# Patient Record
Sex: MALE | Race: WHITE | Employment: FULL TIME | URBAN - METROPOLITAN AREA
[De-identification: names, ages, dates, MRNs, and addresses within clinical notes are randomized per-mention and may not be internally consistent; named-entity substitution may affect disease eponyms.]

---

## 2020-12-21 ENCOUNTER — OFFICE VISIT (OUTPATIENT)
Dept: INTERNAL MEDICINE | Age: 65
End: 2020-12-21
Payer: COMMERCIAL

## 2020-12-21 VITALS
RESPIRATION RATE: 16 BRPM | DIASTOLIC BLOOD PRESSURE: 88 MMHG | HEIGHT: 70 IN | OXYGEN SATURATION: 99 % | TEMPERATURE: 98 F | SYSTOLIC BLOOD PRESSURE: 150 MMHG | WEIGHT: 199.2 LBS | BODY MASS INDEX: 28.52 KG/M2 | HEART RATE: 80 BPM

## 2020-12-21 DIAGNOSIS — Z00.00 ANNUAL PHYSICAL EXAM: ICD-10-CM

## 2020-12-21 LAB
ALBUMIN SERPL-MCNC: 4.3 G/DL (ref 3.5–4.6)
ALP BLD-CCNC: 72 U/L (ref 35–104)
ALT SERPL-CCNC: 16 U/L (ref 0–41)
ANION GAP SERPL CALCULATED.3IONS-SCNC: 11 MEQ/L (ref 9–15)
AST SERPL-CCNC: 19 U/L (ref 0–40)
BASOPHILS ABSOLUTE: 0 K/UL (ref 0–0.2)
BASOPHILS RELATIVE PERCENT: 0.6 %
BILIRUB SERPL-MCNC: 0.4 MG/DL (ref 0.2–0.7)
BUN BLDV-MCNC: 13 MG/DL (ref 8–23)
CALCIUM SERPL-MCNC: 9.4 MG/DL (ref 8.5–9.9)
CHLORIDE BLD-SCNC: 103 MEQ/L (ref 95–107)
CHOLESTEROL, TOTAL: 176 MG/DL (ref 0–199)
CO2: 26 MEQ/L (ref 20–31)
CREAT SERPL-MCNC: 0.8 MG/DL (ref 0.7–1.2)
EOSINOPHILS ABSOLUTE: 0.3 K/UL (ref 0–0.7)
EOSINOPHILS RELATIVE PERCENT: 4 %
GFR AFRICAN AMERICAN: >60
GFR NON-AFRICAN AMERICAN: >60
GLOBULIN: 3.5 G/DL (ref 2.3–3.5)
GLUCOSE BLD-MCNC: 91 MG/DL (ref 70–99)
HCT VFR BLD CALC: 42.5 % (ref 42–52)
HDLC SERPL-MCNC: 45 MG/DL (ref 40–59)
HEMOGLOBIN: 14.1 G/DL (ref 14–18)
LDL CHOLESTEROL CALCULATED: 112 MG/DL (ref 0–129)
LYMPHOCYTES ABSOLUTE: 1.7 K/UL (ref 1–4.8)
LYMPHOCYTES RELATIVE PERCENT: 25 %
MCH RBC QN AUTO: 31.3 PG (ref 27–31.3)
MCHC RBC AUTO-ENTMCNC: 33.2 % (ref 33–37)
MCV RBC AUTO: 94.3 FL (ref 80–100)
MONOCYTES ABSOLUTE: 0.7 K/UL (ref 0.2–0.8)
MONOCYTES RELATIVE PERCENT: 10.3 %
NEUTROPHILS ABSOLUTE: 4 K/UL (ref 1.4–6.5)
NEUTROPHILS RELATIVE PERCENT: 60.1 %
PDW BLD-RTO: 12.7 % (ref 11.5–14.5)
PLATELET # BLD: 308 K/UL (ref 130–400)
POTASSIUM SERPL-SCNC: 4.5 MEQ/L (ref 3.4–4.9)
RBC # BLD: 4.51 M/UL (ref 4.7–6.1)
SODIUM BLD-SCNC: 140 MEQ/L (ref 135–144)
TOTAL PROTEIN: 7.8 G/DL (ref 6.3–8)
TRIGL SERPL-MCNC: 96 MG/DL (ref 0–150)
WBC # BLD: 6.6 K/UL (ref 4.8–10.8)

## 2020-12-21 PROCEDURE — G8484 FLU IMMUNIZE NO ADMIN: HCPCS | Performed by: PHYSICIAN ASSISTANT

## 2020-12-21 PROCEDURE — 99386 PREV VISIT NEW AGE 40-64: CPT | Performed by: PHYSICIAN ASSISTANT

## 2020-12-21 SDOH — HEALTH STABILITY: MENTAL HEALTH: HOW OFTEN DO YOU HAVE A DRINK CONTAINING ALCOHOL?: NEVER

## 2020-12-21 ASSESSMENT — PATIENT HEALTH QUESTIONNAIRE - PHQ9
SUM OF ALL RESPONSES TO PHQ QUESTIONS 1-9: 0
SUM OF ALL RESPONSES TO PHQ QUESTIONS 1-9: 0
1. LITTLE INTEREST OR PLEASURE IN DOING THINGS: 0
SUM OF ALL RESPONSES TO PHQ QUESTIONS 1-9: 0
SUM OF ALL RESPONSES TO PHQ9 QUESTIONS 1 & 2: 0
2. FEELING DOWN, DEPRESSED OR HOPELESS: 0

## 2020-12-21 ASSESSMENT — ENCOUNTER SYMPTOMS
RESPIRATORY NEGATIVE: 1
EYES NEGATIVE: 1
GASTROINTESTINAL NEGATIVE: 1

## 2020-12-21 NOTE — PROGRESS NOTES
2020    César Pineda (:  1955) is a 60 y.o. male, here for a preventive medicine evaluation. There is no problem list on file for this patient. 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, Dr Demian Edward       Review of Systems   Constitutional: Negative. HENT: Negative. Eyes: Negative. Respiratory: Negative. Cardiovascular: Negative. Gastrointestinal: Negative. Endocrine: Negative. Genitourinary: Negative. Musculoskeletal: Negative. Skin: Negative. Neurological: Negative. Hematological: Negative. Psychiatric/Behavioral: Negative.         Prior to Visit Medications    Not on File        Allergies   Allergen Reactions    Bee Venom Anaphylaxis       Past Medical History:   Diagnosis Date    Kidney stone        Past Surgical History:   Procedure Laterality Date    APPENDECTOMY         Social History     Socioeconomic History    Marital status:      Spouse name: Not on file    Number of children: Not on file    Years of education: Not on file    Highest education level: Not on file   Occupational History    Not on file   Social Needs    Financial resource strain: Not on file    Food insecurity     Worry: Not on file     Inability: Not on file    Transportation needs     Medical: Not on file     Non-medical: Not on file   Tobacco Use    Smoking status: Never Smoker    Smokeless tobacco: Never Used   Substance and Sexual Activity    Alcohol use: Never     Frequency: Never    Drug use: Never    Sexual activity: Not on file   Lifestyle    Physical activity     Days per week: Not on file     Minutes per session: Not on file    Stress: Not on file Relationships    Social connections     Talks on phone: Not on file     Gets together: Not on file     Attends Gnosticist service: Not on file     Active member of club or organization: Not on file     Attends meetings of clubs or organizations: Not on file     Relationship status: Not on file    Intimate partner violence     Fear of current or ex partner: Not on file     Emotionally abused: Not on file     Physically abused: Not on file     Forced sexual activity: Not on file   Other Topics Concern    Not on file   Social History Narrative    Not on file        Family History   Problem Relation Age of Onset    Stroke Father     Heart Surgery Brother        ADVANCE DIRECTIVE: N, <no information>    Vitals:    12/21/20 1548 12/21/20 1613   BP: (!) 160/90 (!) 150/88   Site: Left Upper Arm    Position: Sitting    Cuff Size: Large Adult    Pulse: 80    Resp: 16    Temp: 98 °F (36.7 °C)    TempSrc: Temporal    SpO2: 99%    Weight: 199 lb 3.2 oz (90.4 kg)    Height: 5' 10\" (1.778 m)      Estimated body mass index is 28.58 kg/m² as calculated from the following:    Height as of this encounter: 5' 10\" (1.778 m). Weight as of this encounter: 199 lb 3.2 oz (90.4 kg). Physical Exam  Vitals signs reviewed. Constitutional:       Appearance: Normal appearance. HENT:      Head: Normocephalic and atraumatic. Eyes:      Extraocular Movements: Extraocular movements intact. Conjunctiva/sclera: Conjunctivae normal.      Pupils: Pupils are equal, round, and reactive to light. Neck:      Musculoskeletal: Normal range of motion and neck supple. Cardiovascular:      Rate and Rhythm: Normal rate and regular rhythm. Pulses: Normal pulses. Heart sounds: Normal heart sounds. Pulmonary:      Effort: Pulmonary effort is normal.      Breath sounds: Normal breath sounds. Abdominal:      General: Bowel sounds are normal.      Palpations: Abdomen is soft. Skin:     General: Skin is warm.    Neurological: General: No focal deficit present. Mental Status: He is alert. Psychiatric:         Mood and Affect: Mood normal.         Behavior: Behavior normal.         Thought Content: Thought content normal.         Judgment: Judgment normal.         No flowsheet data found. No results found for: CHOL, CHOLFAST, TRIG, TRIGLYCFAST, HDL, LDLCHOLESTEROL, LDLCALC, GLUF, GLUCOSE, LABA1C    The ASCVD Risk score (Michael Arellano et al., 2013) failed to calculate for the following reasons:    Cannot find a previous HDL lab    Cannot find a previous total cholesterol lab    Immunization History   Administered Date(s) Administered    Influenza Virus Vaccine 09/27/2015, 10/21/2020    Tdap (Boostrix, Adacel) 10/21/2013, 06/12/2018    Zoster Live (Zostavax) 09/27/2015    Zoster Recombinant (Shingrix) 09/04/2020       Health Maintenance   Topic Date Due    Hepatitis C screen  1955    HIV screen  07/30/1971    Lipid screen  07/30/1996    Diabetes screen  07/30/1996    Colon cancer screen colonoscopy  07/30/2006    Shingles Vaccine (3 of 3) 10/30/2020    DTaP/Tdap/Td vaccine (3 - Td) 06/12/2028    Flu vaccine  Completed    Hepatitis A vaccine  Aged Out    Hepatitis B vaccine  Aged Out    Hib vaccine  Aged Out    Meningococcal (ACWY) vaccine  Aged Out    Pneumococcal 0-64 years Vaccine  Aged Out       ASSESSMENT/PLAN:  1. Annual physical exam  - CBC Auto Differential; Future  - Comprehensive Metabolic Panel; Future  - Lipid Panel; Future    2. 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 An electronic signature was used to authenticate this note.     --ERIC Barcenas on 12/21/2020 at 4:13 PM

## 2020-12-22 ENCOUNTER — TELEPHONE (OUTPATIENT)
Dept: INTERNAL MEDICINE | Age: 65
End: 2020-12-22

## 2020-12-22 NOTE — LETTER
[facility]  [address]  [place] [address]  Phone: [phone]  Fax: 207 [address]        December 23, 2020     Patient: Sharon Garcia   YOB: 1955   Date of Visit: 12/22/2020       To Whom it May Concern:    Sharon Garcia was seen in my clinic on 12/22/2020. He was seen for annual physical, vital signs and fasting blood work . If you have any questions or concerns, please don't hesitate to call.     Sincerely,         ERIC Wood

## 2021-03-23 ENCOUNTER — OFFICE VISIT (OUTPATIENT)
Dept: INTERNAL MEDICINE | Age: 66
End: 2021-03-23
Payer: COMMERCIAL

## 2021-03-23 VITALS
HEART RATE: 70 BPM | DIASTOLIC BLOOD PRESSURE: 90 MMHG | TEMPERATURE: 98.6 F | HEIGHT: 69 IN | BODY MASS INDEX: 29.33 KG/M2 | WEIGHT: 198 LBS | SYSTOLIC BLOOD PRESSURE: 180 MMHG | OXYGEN SATURATION: 98 %

## 2021-03-23 DIAGNOSIS — R35.1 FREQUENT URINATION AT NIGHT: Primary | ICD-10-CM

## 2021-03-23 DIAGNOSIS — R03.0 ELEVATED BLOOD PRESSURE READING: ICD-10-CM

## 2021-03-23 DIAGNOSIS — R35.1 FREQUENT URINATION AT NIGHT: ICD-10-CM

## 2021-03-23 LAB
BILIRUBIN, POC: NORMAL
BLOOD URINE, POC: NORMAL
CLARITY, POC: CLEAR
COLOR, POC: YELLOW
GLUCOSE URINE, POC: NORMAL
KETONES, POC: NORMAL
LEUKOCYTE EST, POC: NORMAL
NITRITE, POC: NORMAL
PH, POC: 6
PROTEIN, POC: NORMAL
SPECIFIC GRAVITY, POC: 1.01
UROBILINOGEN, POC: 0.2

## 2021-03-23 PROCEDURE — G8419 CALC BMI OUT NRM PARAM NOF/U: HCPCS | Performed by: NURSE PRACTITIONER

## 2021-03-23 PROCEDURE — 1036F TOBACCO NON-USER: CPT | Performed by: NURSE PRACTITIONER

## 2021-03-23 PROCEDURE — 99213 OFFICE O/P EST LOW 20 MIN: CPT | Performed by: NURSE PRACTITIONER

## 2021-03-23 PROCEDURE — 81003 URINALYSIS AUTO W/O SCOPE: CPT | Performed by: NURSE PRACTITIONER

## 2021-03-23 PROCEDURE — 3017F COLORECTAL CA SCREEN DOC REV: CPT | Performed by: NURSE PRACTITIONER

## 2021-03-23 PROCEDURE — G8427 DOCREV CUR MEDS BY ELIG CLIN: HCPCS | Performed by: NURSE PRACTITIONER

## 2021-03-23 PROCEDURE — G8484 FLU IMMUNIZE NO ADMIN: HCPCS | Performed by: NURSE PRACTITIONER

## 2021-03-23 RX ORDER — TAMSULOSIN HYDROCHLORIDE 0.4 MG/1
0.4 CAPSULE ORAL DAILY
Qty: 14 CAPSULE | Refills: 0 | Status: SHIPPED | OUTPATIENT
Start: 2021-03-23 | End: 2021-04-05 | Stop reason: SDUPTHER

## 2021-03-23 RX ORDER — SULFAMETHOXAZOLE AND TRIMETHOPRIM 800; 160 MG/1; MG/1
1 TABLET ORAL 2 TIMES DAILY
Qty: 14 TABLET | Refills: 0 | Status: SHIPPED | OUTPATIENT
Start: 2021-03-23 | End: 2021-03-30

## 2021-03-23 SDOH — ECONOMIC STABILITY: TRANSPORTATION INSECURITY
IN THE PAST 12 MONTHS, HAS LACK OF TRANSPORTATION KEPT YOU FROM MEETINGS, WORK, OR FROM GETTING THINGS NEEDED FOR DAILY LIVING?: NO

## 2021-03-23 SDOH — ECONOMIC STABILITY: INCOME INSECURITY: HOW HARD IS IT FOR YOU TO PAY FOR THE VERY BASICS LIKE FOOD, HOUSING, MEDICAL CARE, AND HEATING?: NOT HARD AT ALL

## 2021-03-23 SDOH — ECONOMIC STABILITY: FOOD INSECURITY: WITHIN THE PAST 12 MONTHS, YOU WORRIED THAT YOUR FOOD WOULD RUN OUT BEFORE YOU GOT MONEY TO BUY MORE.: NEVER TRUE

## 2021-03-23 ASSESSMENT — ENCOUNTER SYMPTOMS
SHORTNESS OF BREATH: 0
BACK PAIN: 0
ABDOMINAL PAIN: 0
CHEST TIGHTNESS: 0
COUGH: 0
WHEEZING: 0

## 2021-03-23 ASSESSMENT — PATIENT HEALTH QUESTIONNAIRE - PHQ9
2. FEELING DOWN, DEPRESSED OR HOPELESS: 0
SUM OF ALL RESPONSES TO PHQ QUESTIONS 1-9: 0
SUM OF ALL RESPONSES TO PHQ QUESTIONS 1-9: 0
SUM OF ALL RESPONSES TO PHQ9 QUESTIONS 1 & 2: 0
1. LITTLE INTEREST OR PLEASURE IN DOING THINGS: 0

## 2021-03-23 NOTE — PATIENT INSTRUCTIONS
Patient Education        Urge Incontinence in Women: Care Instructions  Your Care Instructions     Urge incontinence occurs when the need to urinate is so strong that you cannot reach the toilet in time, even when your bladder contains only a small amount of urine. This is also called overactive bladder or unstable bladder. Some women may have no warning before they leak urine. This condition does not cause major health problems. But it can be embarrassing and can affect a woman's self-esteem and confidence. Treatment can cure or improve your symptoms. Follow-up care is a key part of your treatment and safety. Be sure to make and go to all appointments, and call your doctor if you are having problems. It's also a good idea to know your test results and keep a list of the medicines you take. How can you care for yourself at home? · Be safe with medicines. Take your medicines exactly as prescribed. Call your doctor if you think you are having a problem with your medicine. You will get more details on the specific medicines your doctor prescribes. · Limit caffeine and alcohol. They stimulate urine production. · Urinate every 2 to 4 hours during waking hours, even if you feel that you do not have to go. · Do pelvic floor (Kegel) exercises, which tighten and strengthen pelvic muscles. To do Kegel exercises:  ? Squeeze the same muscles you would use to stop your urine. Your belly and thighs should not move. ? Hold the squeeze for 3 seconds, then relax for 3 seconds. ? Start with 3 seconds. Then add 1 second each week until you are able to squeeze for 10 seconds. ? Repeat the exercise 10 to 15 times for each session. Do three or more sessions each day. · Try wearing pads that absorb the leaks. · Keep skin in the genital area dry. When should you call for help? Call your doctor now or seek immediate medical care if:    · You have new urinary symptoms.  These may include leaking urine, having pain when urinating, or feeling like you need to urinate often. Watch closely for changes in your health, and be sure to contact your doctor if:    · You do not get better as expected. Where can you learn more? Go to https://julián.Compliance 360. org and sign in to your Friend.ly account. Enter F403 in the [facility] box to learn more about \"Urge Incontinence in Women: Care Instructions. \"     If you do not have an account, please click on the \"Sign Up Now\" link. Current as of: July 17, 2020               Content Version: 12.8  © 2006-2021 [employer]. Care instructions adapted under license by [facility] ([facility]). If you have questions about a medical condition or this instruction, always ask your healthcare professional. [address] any warranty or liability for your use of this information. Patient Education        Elevated Blood Pressure: Care Instructions  Your Care Instructions    Blood pressure is a measure of how hard the blood pushes against the walls of your arteries. It's normal for blood pressure to go up and down throughout the day. But if it stays up over time, you have high blood pressure. Two numbers tell you your blood pressure. The first number is the systolic pressure. It shows how hard the blood pushes when your heart is pumping. The second number is the diastolic pressure. It shows how hard the blood pushes between heartbeats, when your heart is relaxed and filling with blood. An ideal blood pressure in adults is less than 120/80 (say \"120 over 80\"). High blood pressure is 140/90 or higher. You have high blood pressure if your top number is 140 or higher or your bottom number is 90 or higher, or both. The main test for high blood pressure is simple, fast, and painless. To diagnose high blood pressure, your doctor will test your blood pressure at different times.  After testing your blood pressure, your doctor may ask you to test it again when you are home. If you are diagnosed with high blood pressure, you can work with your doctor to make a long-term plan to manage it. Follow-up care is a key part of your treatment and safety. Be sure to make and go to all appointments, and call your doctor if you are having problems. It's also a good idea to know your test results and keep a list of the medicines you take. How can you care for yourself at home? · Do not smoke. Smoking increases your risk for heart attack and stroke. If you need help quitting, talk to your doctor about stop-smoking programs and medicines. These can increase your chances of quitting for good. · Stay at a healthy weight. · Try to limit how much sodium you eat to less than 2,300 milligrams (mg) a day. Your doctor may ask you to try to eat less than 1,500 mg a day. · Be physically active. Get at least 30 minutes of exercise on most days of the week. Walking is a good choice. You also may want to do other activities, such as running, swimming, cycling, or playing tennis or team sports. · Avoid or limit alcohol. Talk to your doctor about whether you can drink any alcohol. · Eat plenty of fruits, vegetables, and low-fat dairy products. Eat less saturated and total fats. · Learn how to check your blood pressure at home. When should you call for help? Call your doctor now or seek immediate medical care if:    · Your blood pressure is much higher than normal (such as 180/110 or higher).     · You think high blood pressure is causing symptoms such as:  ¨ Severe headache. ¨ Blurry vision.    Watch closely for changes in your health, and be sure to contact your doctor if:    · You do not get better as expected. Where can you learn more? Go to https://GenieTownadan.Blink. org and sign in to your [employer] account. Enter P938 in the "[employer] box to learn more about \"Elevated Blood Pressure: Care Instructions. \"     If you do not have an account, please click on the \"Sign Up Now\" link. Current as of: May 10, 2017  Content Version: 11.6  © 2651-7801 [employer]. Care instructions adapted under license by [facility] ([facility]). If you have questions about a medical condition or this instruction, always ask your healthcare professional. [address] any warranty or liability for your use of this information.

## 2021-03-23 NOTE — PROGRESS NOTES
Theadora Merlin (:  1955) is a 60 y.o. male, Established patient, here for evaluation of the following chief complaint(s):  Dysuria (urinary frequency and urgency hx of kidney stones, left sided flank pain 3 weeks ago. No prostate hx Patient has a hx of untreated HTN)      Vitals:    21 1719   BP: (!) 180/90   Pulse:    Temp:    SpO2:        ASSESSMENT/PLAN:  1. Frequent urination at night  -     tamsulosin (FLOMAX) 0.4 MG capsule; Take 1 capsule by mouth daily, Disp-14 capsule, R-0Normal  -     sulfamethoxazole-trimethoprim (BACTRIM DS;SEPTRA DS) 800-160 MG per tablet; Take 1 tablet by mouth 2 times daily for 7 days, Disp-14 tablet, R-0Normal  -     F/u with PCP within 2 weeks    2. Elevated blood pressure reading         -    Encouraged F/u with PCP within 2 weeks         -    Pt has been reluctant to get on BP med, likes not being on any medicine         -    Pt has strong history of jami blood pressure (Dad had MI, mom had stroke, brother has stents, other brother is on BP med)            Return in about 2 weeks (around 2021) for follow up with PCP. SUBJECTIVE/OBJECTIVE:    Urinary Frequency   This is a new problem. Episode onset: for the past 2-3 weeks has been voiding alot more, especially at night, feeling he's not empyting bladder completely, have to use more pressure in order to void. The problem occurs every urination. The pain is at a severity of 0/10. The patient is experiencing no pain. There has been no fever. Associated symptoms include frequency and urgency. He has tried increased fluids for the symptoms. Review of Systems   Constitutional: Negative for fatigue and fever. Respiratory: Negative for cough, chest tightness, shortness of breath and wheezing. Cardiovascular: Negative for chest pain and palpitations. Gastrointestinal: Negative for abdominal pain. Genitourinary: Positive for frequency and urgency. Musculoskeletal: Negative for back pain. Neurological: Negative for dizziness, light-headedness and headaches. Physical Exam  Vitals signs reviewed. Constitutional:       General: He is not in acute distress. Appearance: Normal appearance. He is well-groomed. He is not ill-appearing. Cardiovascular:      Rate and Rhythm: Normal rate and regular rhythm. Heart sounds: Normal heart sounds, S1 normal and S2 normal.   Pulmonary:      Effort: Pulmonary effort is normal. No respiratory distress. Breath sounds: Normal air entry. No decreased breath sounds, wheezing, rhonchi or rales. Abdominal:      General: Bowel sounds are normal.      Palpations: Abdomen is soft. Tenderness: There is abdominal tenderness in the suprapubic area. There is no right CVA tenderness or left CVA tenderness. Musculoskeletal: Normal range of motion. Skin:     General: Skin is warm and dry. Neurological:      Mental Status: He is alert and oriented to person, place, and time. Psychiatric:         Attention and Perception: Attention normal.         Mood and Affect: Mood normal.         Speech: Speech normal.         Behavior: Behavior is cooperative. An electronic signature was used to authenticate this note.     --LOCO Naqvi

## 2021-03-25 LAB — URINE CULTURE, ROUTINE: NORMAL

## 2021-04-02 ENCOUNTER — OFFICE VISIT (OUTPATIENT)
Dept: INTERNAL MEDICINE | Age: 66
End: 2021-04-02
Payer: COMMERCIAL

## 2021-04-02 VITALS
DIASTOLIC BLOOD PRESSURE: 76 MMHG | BODY MASS INDEX: 29.27 KG/M2 | TEMPERATURE: 97.3 F | WEIGHT: 197.6 LBS | OXYGEN SATURATION: 98 % | HEART RATE: 70 BPM | SYSTOLIC BLOOD PRESSURE: 128 MMHG | HEIGHT: 69 IN

## 2021-04-02 DIAGNOSIS — R35.1 FREQUENT URINATION AT NIGHT: ICD-10-CM

## 2021-04-02 DIAGNOSIS — R35.1 FREQUENT URINATION AT NIGHT: Primary | ICD-10-CM

## 2021-04-02 LAB — PROSTATE SPECIFIC ANTIGEN: 6.54 NG/ML (ref 0–5.4)

## 2021-04-02 PROCEDURE — 3017F COLORECTAL CA SCREEN DOC REV: CPT | Performed by: PHYSICIAN ASSISTANT

## 2021-04-02 PROCEDURE — G8427 DOCREV CUR MEDS BY ELIG CLIN: HCPCS | Performed by: PHYSICIAN ASSISTANT

## 2021-04-02 PROCEDURE — 1036F TOBACCO NON-USER: CPT | Performed by: PHYSICIAN ASSISTANT

## 2021-04-02 PROCEDURE — G8419 CALC BMI OUT NRM PARAM NOF/U: HCPCS | Performed by: PHYSICIAN ASSISTANT

## 2021-04-02 PROCEDURE — 99213 OFFICE O/P EST LOW 20 MIN: CPT | Performed by: PHYSICIAN ASSISTANT

## 2021-04-02 PROCEDURE — 36415 COLL VENOUS BLD VENIPUNCTURE: CPT | Performed by: PHYSICIAN ASSISTANT

## 2021-04-02 NOTE — PROGRESS NOTES
Marixa Ojeda (: 1955) is a 60 y.o. male, Established patient, here for evaluation of the following chief complaint(s):  Follow-up (Pt was here at [facility] on 3/23/21, came in for a poss UTI, says that he still has frequent urination, slight flank pain, urine came back normal. )        ASSESSMENT/PLAN:  1. Frequent urination at night  -Likely BPH, will check the PSA and refer to urology  -Continue Flomax  - PSA Screening; Future  - Ambulatory referral to Urology        No follow-ups on file. SUBJECTIVE/OBJECTIVE:  HPI    Follow-up on urinary frequency at night  Was seen in the [facility] clinic  Urine and culture was negative, he was started on Flomax that has helped    Review of Systems   Constitutional: Negative. HENT: Negative. Respiratory: Negative. Genitourinary: Positive for frequency and urgency. Negative for decreased urine volume, difficulty urinating, hematuria, penile pain, penile swelling, scrotal swelling and testicular pain. Physical Exam  Vitals signs reviewed. Constitutional:       Appearance: Normal appearance. Cardiovascular:      Rate and Rhythm: Normal rate and regular rhythm. Pulses: Normal pulses. Heart sounds: Normal heart sounds. Pulmonary:      Effort: Pulmonary effort is normal.      Breath sounds: Normal breath sounds. Abdominal:      Tenderness: There is no right CVA tenderness or left CVA tenderness. Neurological:      Mental Status: He is alert. Vitals:    21 1522   BP: 128/76   Site: Left Upper Arm   Position: Sitting   Cuff Size: Large Adult   Pulse: 70   Temp: 97.3 °F (36.3 °C)   TempSrc: Temporal   SpO2: 98%   Weight: 197 lb 9.6 oz (89.6 kg)   Height: 5' 8.5\" (1.74 m)                 An electronic signature was used to authenticate this note.     --[facility]

## 2021-04-05 ENCOUNTER — TELEPHONE (OUTPATIENT)
Dept: INTERNAL MEDICINE | Age: 66
End: 2021-04-05

## 2021-04-05 DIAGNOSIS — R35.1 FREQUENT URINATION AT NIGHT: ICD-10-CM

## 2021-04-05 DIAGNOSIS — R97.20 ELEVATED PSA: Primary | ICD-10-CM

## 2021-04-05 RX ORDER — TAMSULOSIN HYDROCHLORIDE 0.4 MG/1
0.4 CAPSULE ORAL DAILY
Qty: 90 CAPSULE | Refills: 1 | Status: SHIPPED | OUTPATIENT
Start: 2021-04-05

## 2021-04-05 NOTE — TELEPHONE ENCOUNTER
Patient needs a refill on his Flomax but he has an upcoming with Dr. Rupa Espinoza. Should he refill or wait until he sees Dr. Rupa Espinoza?   Please advise    Thank you

## 2021-04-10 ASSESSMENT — ENCOUNTER SYMPTOMS: RESPIRATORY NEGATIVE: 1

## 2021-05-20 ENCOUNTER — OFFICE VISIT (OUTPATIENT)
Dept: UROLOGY | Age: 66
End: 2021-05-20
Payer: COMMERCIAL

## 2021-05-20 VITALS
SYSTOLIC BLOOD PRESSURE: 154 MMHG | WEIGHT: 200 LBS | HEART RATE: 85 BPM | HEIGHT: 69 IN | BODY MASS INDEX: 29.62 KG/M2 | DIASTOLIC BLOOD PRESSURE: 102 MMHG

## 2021-05-20 DIAGNOSIS — N40.1 HYPERTROPHY OF PROSTATE WITH URINARY OBSTRUCTION: ICD-10-CM

## 2021-05-20 DIAGNOSIS — R35.1 NOCTURIA: ICD-10-CM

## 2021-05-20 DIAGNOSIS — N13.8 HYPERTROPHY OF PROSTATE WITH URINARY OBSTRUCTION: ICD-10-CM

## 2021-05-20 DIAGNOSIS — R97.20 ELEVATED PSA: ICD-10-CM

## 2021-05-20 DIAGNOSIS — R35.1 NOCTURIA: Primary | ICD-10-CM

## 2021-05-20 LAB
ANION GAP SERPL CALCULATED.3IONS-SCNC: 14 MEQ/L (ref 9–15)
BILIRUBIN, POC: NORMAL
BLOOD URINE, POC: NORMAL
BUN BLDV-MCNC: 47 MG/DL (ref 8–23)
CALCIUM SERPL-MCNC: 9.9 MG/DL (ref 8.5–9.9)
CHLORIDE BLD-SCNC: 106 MEQ/L (ref 95–107)
CLARITY, POC: CLEAR
CO2: 22 MEQ/L (ref 20–31)
COLOR, POC: YELLOW
CREAT SERPL-MCNC: 2.97 MG/DL (ref 0.7–1.2)
GFR AFRICAN AMERICAN: 25.9
GFR NON-AFRICAN AMERICAN: 21.4
GLUCOSE BLD-MCNC: 97 MG/DL (ref 70–99)
GLUCOSE URINE, POC: NORMAL
KETONES, POC: NORMAL
LEUKOCYTE EST, POC: NORMAL
NITRITE, POC: NORMAL
PH, POC: 6
POTASSIUM SERPL-SCNC: 4.5 MEQ/L (ref 3.4–4.9)
PROSTATE SPECIFIC ANTIGEN: 5.17 NG/ML (ref 0–5.4)
PROTEIN, POC: NORMAL
SODIUM BLD-SCNC: 142 MEQ/L (ref 135–144)
SPECIFIC GRAVITY, POC: 1.01
UROBILINOGEN, POC: 0.2

## 2021-05-20 PROCEDURE — 1036F TOBACCO NON-USER: CPT | Performed by: UROLOGY

## 2021-05-20 PROCEDURE — 51798 US URINE CAPACITY MEASURE: CPT | Performed by: UROLOGY

## 2021-05-20 PROCEDURE — G8419 CALC BMI OUT NRM PARAM NOF/U: HCPCS | Performed by: UROLOGY

## 2021-05-20 PROCEDURE — 81003 URINALYSIS AUTO W/O SCOPE: CPT | Performed by: UROLOGY

## 2021-05-20 PROCEDURE — G8427 DOCREV CUR MEDS BY ELIG CLIN: HCPCS | Performed by: UROLOGY

## 2021-05-20 PROCEDURE — 51741 ELECTRO-UROFLOWMETRY FIRST: CPT | Performed by: UROLOGY

## 2021-05-20 PROCEDURE — 99204 OFFICE O/P NEW MOD 45 MIN: CPT | Performed by: UROLOGY

## 2021-05-20 PROCEDURE — 3017F COLORECTAL CA SCREEN DOC REV: CPT | Performed by: UROLOGY

## 2021-05-20 RX ORDER — FINASTERIDE 5 MG/1
5 TABLET, FILM COATED ORAL DAILY
Qty: 90 TABLET | Refills: 3 | Status: SHIPPED | OUTPATIENT
Start: 2021-05-20

## 2021-05-20 ASSESSMENT — ENCOUNTER SYMPTOMS
RESPIRATORY NEGATIVE: 1
VOMITING: 0
NAUSEA: 0
ABDOMINAL PAIN: 0
DIARRHEA: 0
BACK PAIN: 0
GASTROINTESTINAL NEGATIVE: 1
SHORTNESS OF BREATH: 0
ABDOMINAL DISTENTION: 0

## 2021-05-20 NOTE — PROGRESS NOTES
Subjective:      Patient ID: Shikha Nathan is a 60 y.o. male. HPI This is a 61 yo male with h/o kidney stones (passed spontaneously yrs ago, last 2013) and sent for evaluation of 2-3 months of frequency. He reports have slowly progressive LUTs for a few years but over the last 2-3 months has had NF 4--5 and DF > 8. He also has hesitancy and straining with abdominal muscles to void and gets intermittency and PVD. He had a slower flow but this has improved with Flomax started about 2 mo ago. he has no SE from Flomax reported. He has no hematuria or dysuria. He has no UTI's or incontinence. He has no abd or flank pain. He has no splaying. He does not feel the bladder empties. He has no abd or flank pain. He has no F/C or N/V. He has no prior Gu surgical history. He has no prior catheters or STD's. I reviewed the recent PSA results. He does not smoke ciggs. He has a brother with prostate cancer. He has no other complaints.      Past Medical History:   Diagnosis Date    Kidney stone      Past Surgical History:   Procedure Laterality Date    APPENDECTOMY       Social History     Socioeconomic History    Marital status:      Spouse name: Not on file    Number of children: Not on file    Years of education: Not on file    Highest education level: Not on file   Occupational History    Not on file   Tobacco Use    Smoking status: Never Smoker    Smokeless tobacco: Never Used   Substance and Sexual Activity    Alcohol use: Never    Drug use: Never    Sexual activity: Not on file   Other Topics Concern    Not on file   Social History Narrative    Not on file     Social Determinants of Health     Financial Resource Strain: Low Risk     Difficulty of Paying Living Expenses: Not hard at all   Food Insecurity: No Food Insecurity    Worried About Running Out of Food in the Last Year: Never true    Sarah of Food in the Last Year: Never true   Transportation Needs: No Transportation Needs    Lack of Transportation (Medical): No    Lack of Transportation (Non-Medical): No   Physical Activity:     Days of Exercise per Week:     Minutes of Exercise per Session:    Stress:     Feeling of Stress :    Social Connections:     Frequency of Communication with Friends and Family:     Frequency of Social Gatherings with Friends and Family:     Attends Roman Catholic Services:     Active Member of Clubs or Organizations:     Attends Club or Organization Meetings:     Marital Status:    Intimate Partner Violence:     Fear of Current or Ex-Partner:     Emotionally Abused:     Physically Abused:     Sexually Abused:      Family History   Problem Relation Age of Onset    Stroke Father     Heart Surgery Brother      Current Outpatient Medications   Medication Sig Dispense Refill    tamsulosin (FLOMAX) 0.4 MG capsule Take 1 capsule by mouth daily 90 capsule 1     No current facility-administered medications for this visit. Bee venom  reviewed      Review of Systems   Constitutional: Positive for appetite change. Negative for fatigue and fever. HENT: Negative. Eyes: Positive for visual disturbance. Respiratory: Negative. Negative for shortness of breath. Cardiovascular: Negative. Negative for chest pain. Gastrointestinal: Negative. Negative for abdominal distention, abdominal pain, diarrhea, nausea and vomiting. Endocrine: Negative. Genitourinary: Positive for difficulty urinating and frequency. Negative for decreased urine volume, discharge, dysuria, enuresis, flank pain, genital sores, hematuria, penile pain, penile swelling, scrotal swelling and testicular pain. Musculoskeletal: Negative. Negative for back pain. Skin: Negative. Neurological: Negative. Hematological: Negative. Psychiatric/Behavioral: Negative. Objective:   Physical Exam  Constitutional:       Appearance: Normal appearance. HENT:      Head: Normocephalic and atraumatic.    Eyes:      Conjunctiva/sclera: Unknown   clear    Glucose, UA POC 05/20/2021  2:47 PM Unknown   neg    Bilirubin, UA 05/20/2021  2:47 PM Unknown   neg    Ketones, UA 05/20/2021  2:47 PM Unknown   neg    Spec Grav, UA 05/20/2021  2:47 PM Unknown   1.010    Blood, UA POC 05/20/2021  2:47 PM Unknown   neg    pH, UA 05/20/2021  2:47 PM Unknown   6.0    Protein, UA POC 05/20/2021  2:47 PM Unknown   neg    Urobilinogen, UA 05/20/2021  2:47 PM Unknown   0.2    Leukocytes, UA 05/20/2021  2:47 PM Unknown   neg    Nitrite, UA 05/20/2021  2:47 PM Unknown   neg    Lab and Collection        Uroflow: 13 ml/sec, vol 287 cc (but pattern was intermittent)  post void residual by U/S: 808 cc and voided another 200 cc    Assessment: This is a 59 yo male with h/o kidney stones (passed spontaneously yrs ago, last 2013) and with BPH by exam and LUTs that have become more bothersome over last few months and flow has improved on Flomax. He shows intermittent voiding and incomplete bladder emptying today and given degree of incomplete voiding, I recommend a renal U/S and BMP. He may need to consider cystoscopy in future as well and if there are any signs of UT obstruction may need a Phillips catheter. He also has an elevated PSA and I recommednd repeat PSA and if still elevated, may need to consider a prostate biopsy. I also recommend he start a 5ARI for maximum medical therapy. The proper dosing and SE were reviewed and he wants to proceed. Plan:      1. Repeat PSA and BMP  2. F/U 1-2 weeks for Renal U/S and repeat Flow/PVR  3.    Orders Placed This Encounter   Medications    finasteride (PROSCAR) 5 MG tablet     Sig: Take 1 tablet by mouth daily     Dispense:  90 tablet     Refill:  3             Kathleen Dill MD

## 2021-05-24 ENCOUNTER — HOSPITAL ENCOUNTER (OUTPATIENT)
Dept: ULTRASOUND IMAGING | Age: 66
Discharge: HOME | End: 2021-05-26
Payer: COMMERCIAL

## 2021-05-24 DIAGNOSIS — R35.1 NOCTURIA: ICD-10-CM

## 2021-05-24 PROCEDURE — 76770 US EXAM ABDO BACK WALL COMP: CPT

## 2021-05-25 ENCOUNTER — OFFICE VISIT (OUTPATIENT)
Dept: UROLOGY | Age: 66
End: 2021-05-25
Payer: COMMERCIAL

## 2021-05-25 VITALS
BODY MASS INDEX: 29.62 KG/M2 | DIASTOLIC BLOOD PRESSURE: 104 MMHG | WEIGHT: 200 LBS | HEIGHT: 69 IN | HEART RATE: 91 BPM | SYSTOLIC BLOOD PRESSURE: 154 MMHG

## 2021-05-25 DIAGNOSIS — R33.9 URINARY RETENTION: ICD-10-CM

## 2021-05-25 DIAGNOSIS — N13.30 HYDRONEPHROSIS, UNSPECIFIED HYDRONEPHROSIS TYPE: Primary | ICD-10-CM

## 2021-05-25 PROCEDURE — 51741 ELECTRO-UROFLOWMETRY FIRST: CPT | Performed by: UROLOGY

## 2021-05-25 PROCEDURE — 51703 INSERT BLADDER CATH COMPLEX: CPT | Performed by: UROLOGY

## 2021-05-25 PROCEDURE — G8419 CALC BMI OUT NRM PARAM NOF/U: HCPCS | Performed by: UROLOGY

## 2021-05-25 PROCEDURE — 3017F COLORECTAL CA SCREEN DOC REV: CPT | Performed by: UROLOGY

## 2021-05-25 PROCEDURE — 99214 OFFICE O/P EST MOD 30 MIN: CPT | Performed by: UROLOGY

## 2021-05-25 PROCEDURE — 51798 US URINE CAPACITY MEASURE: CPT | Performed by: UROLOGY

## 2021-05-25 PROCEDURE — G8427 DOCREV CUR MEDS BY ELIG CLIN: HCPCS | Performed by: UROLOGY

## 2021-05-25 PROCEDURE — 1036F TOBACCO NON-USER: CPT | Performed by: UROLOGY

## 2021-05-25 ASSESSMENT — ENCOUNTER SYMPTOMS: SHORTNESS OF BREATH: 0

## 2021-05-25 NOTE — PROGRESS NOTES
Subjective:      Patient ID: Adam Marcos is a 60 y.o. male. HPI This is a 61 yo male with h/o kidney stones (passed spontaneously yrs ago, last 2013) and back for evaluation of 2-3 months of frequency. He reports have slowly progressive LUTs for a few years but over the last 2-3 months has had NF 4--5 and DF > 8. He also has hesitancy and straining with abdominal muscles to void and gets intermittency and PVD. He had a slower flow but this has improved with Flomax started about 2 mo ago. He had a BMP done that showed DOV and then had a Renal U/S done urgently showing bilateral hydronephrosis and he is here today for possible cathter placement. He was started on Proscar at the last visit. He has no pain or dysuria or hematuria. He is thirsty over last few weeks. He has a brother with prostate cancer. He has no other complaints. I reviewed the interval labs and U/S results in detail today and I recommend a Phillips catheter and he agrees. Past Medical History:   Diagnosis Date    Kidney stone      Past Surgical History:   Procedure Laterality Date    APPENDECTOMY       Social History     Socioeconomic History    Marital status:      Spouse name: None    Number of children: None    Years of education: None    Highest education level: None   Occupational History    None   Tobacco Use    Smoking status: Never Smoker    Smokeless tobacco: Never Used   Substance and Sexual Activity    Alcohol use: Never    Drug use: Never    Sexual activity: None   Other Topics Concern    None   Social History Narrative    None     Social Determinants of Health     Financial Resource Strain: Low Risk     Difficulty of Paying Living Expenses: Not hard at all   Food Insecurity: No Food Insecurity    Worried About Running Out of Food in the Last Year: Never true    [address] in the Last Year: Never true   Transportation Needs: No Transportation Needs    Lack of Transportation (Medical):  No    Lack of Transportation (Non-Medical): No   Physical Activity:     Days of Exercise per Week:     Minutes of Exercise per Session:    Stress:     Feeling of Stress :    Social Connections:     Frequency of Communication with Friends and Family:     Frequency of Social Gatherings with Friends and Family:     Attends Yazdanism Services:     Active Member of Clubs or Organizations:     Attends Club or Organization Meetings:     Marital Status:    Intimate Partner Violence:     Fear of Current or Ex-Partner:     Emotionally Abused:     Physically Abused:     Sexually Abused:      Family History   Problem Relation Age of Onset    Stroke Father     Heart Surgery Brother      Current Outpatient Medications   Medication Sig Dispense Refill    finasteride (PROSCAR) 5 MG tablet Take 1 tablet by mouth daily 90 tablet 3    tamsulosin (FLOMAX) 0.4 MG capsule Take 1 capsule by mouth daily 90 capsule 1     No current facility-administered medications for this visit. Bee venom  reviewed      Review of Systems   Constitutional: Negative for unexpected weight change. Respiratory: Negative for shortness of breath. Genitourinary: Negative for flank pain and hematuria. Objective:   Physical Exam  Constitutional:       Appearance: Normal appearance. Genitourinary:     Penis: Normal.       Comments: There is mild sp distention  Neurological:      Mental Status: He is alert.          5/20/2021  5:43 PM - Dk, Chpo Incoming Lab Results From Soft    Component Value Ref Range & Units Status Collected Lab   Sodium 142  135 - 144 mEq/L Final 05/20/2021  3:15 PM [facility]   Potassium 4.5  3.4 - 4.9 mEq/L Final 05/20/2021  3:15 PM [address] [facility]   Chloride 106  95 - 107 mEq/L Final 05/20/2021  3:15 PM [address] [facility]   CO2 22  20 - 31 mEq/L Final 05/20/2021  3:15 PM [facility] - [facility]   Anion Gap 14  9 - 15 mEq/L Final 05/20/2021  3:15 PM [address] [facility]   Glucose 97  70 - FINDINGS:  Biplanar images were obtained.  The distended bladder has a volume of 999 mL.    After voiding, the bladder volume measures 590 mL. The bladder wall is trabeculated. Bilateral ureteral jets are visualized.       IMPRESSION:         LARGE POST VOID RESIDUAL.       BLADDER WALL TRABECULATION                   PSA   Date Value Ref Range Status   05/20/2021 5.17 0.00 - 5.40 ng/mL Final   04/02/2021 6.54 (H) 0.00 - 5.40 ng/mL Final     Comment:     When the Total PSA is between 3.00 and 10.00 ng/mL, consider  requesting a Free PSA to aid in diagnosis. Procedure: under sterile conditions, with a 2% Urojet a 16 Fr Coude catheter was placed into the bladder and 750 cc of clear urine was drained and there was resistance in the prostatic urethra making catheter placement complex. Uroflow: 11 ml/sec, but intermittent flow and PVR > 750 cc    Assessment: This is a 61 yo male with h/o kidney stones (passed spontaneously yrs ago, last 2013) and with BPH by exam and LUTs that have progressed over last few months and now has DOV and bilateral hydronephrosis by U/S. These findings are likely related to YAN and catheter was placed for a large PVR. I am concerned this may be related to a hypotonic bladder given he had no pain or sense of bladder fullness. I recommend decompression for at least 1 month and F/U on BMP and U/S for improved hydronephrosis. I also recommednd he watch for post-obstructive diuresis and uses electrolyte drinks to help replace volume over next few days. Will also continue with close PSA follow-up in future and may need cystscopy with TRUS and possible UD in future. Plan:      1. F/U 2-3 weeks for Renal U/S and BMP when back from vacation  2.  Routine catheter care was discussed        Dea Bradford MD

## 2021-06-07 ENCOUNTER — HOSPITAL ENCOUNTER (OUTPATIENT)
Dept: ULTRASOUND IMAGING | Age: 66
Discharge: HOME | End: 2021-06-09
Payer: COMMERCIAL

## 2021-06-07 ENCOUNTER — HOSPITAL ENCOUNTER (OUTPATIENT)
Dept: LAB | Age: 66
Discharge: HOME | End: 2021-06-07
Payer: COMMERCIAL

## 2021-06-07 DIAGNOSIS — N13.30 HYDRONEPHROSIS, UNSPECIFIED HYDRONEPHROSIS TYPE: ICD-10-CM

## 2021-06-07 LAB
ANION GAP SERPL CALCULATED.3IONS-SCNC: 11 MEQ/L (ref 9–15)
BUN BLDV-MCNC: 28 MG/DL (ref 8–23)
CALCIUM SERPL-MCNC: 9.6 MG/DL (ref 8.5–9.9)
CHLORIDE BLD-SCNC: 100 MEQ/L (ref 95–107)
CO2: 24 MEQ/L (ref 20–31)
CREAT SERPL-MCNC: 1.64 MG/DL (ref 0.7–1.2)
GFR AFRICAN AMERICAN: 51.3
GFR NON-AFRICAN AMERICAN: 42.4
GLUCOSE BLD-MCNC: 86 MG/DL (ref 70–99)
POTASSIUM SERPL-SCNC: 4.6 MEQ/L (ref 3.4–4.9)
SODIUM BLD-SCNC: 135 MEQ/L (ref 135–144)

## 2021-06-07 PROCEDURE — 76770 US EXAM ABDO BACK WALL COMP: CPT

## 2021-06-07 PROCEDURE — 36415 COLL VENOUS BLD VENIPUNCTURE: CPT

## 2021-06-07 PROCEDURE — 80048 BASIC METABOLIC PNL TOTAL CA: CPT

## 2021-06-10 ENCOUNTER — OFFICE VISIT (OUTPATIENT)
Dept: UROLOGY | Age: 66
End: 2021-06-10
Payer: COMMERCIAL

## 2021-06-10 VITALS
HEIGHT: 69 IN | SYSTOLIC BLOOD PRESSURE: 138 MMHG | HEART RATE: 79 BPM | DIASTOLIC BLOOD PRESSURE: 86 MMHG | WEIGHT: 195 LBS | BODY MASS INDEX: 28.88 KG/M2

## 2021-06-10 DIAGNOSIS — R33.9 URINARY RETENTION: Primary | ICD-10-CM

## 2021-06-10 PROCEDURE — G8419 CALC BMI OUT NRM PARAM NOF/U: HCPCS | Performed by: UROLOGY

## 2021-06-10 PROCEDURE — 1036F TOBACCO NON-USER: CPT | Performed by: UROLOGY

## 2021-06-10 PROCEDURE — 3017F COLORECTAL CA SCREEN DOC REV: CPT | Performed by: UROLOGY

## 2021-06-10 PROCEDURE — G8427 DOCREV CUR MEDS BY ELIG CLIN: HCPCS | Performed by: UROLOGY

## 2021-06-10 PROCEDURE — 99214 OFFICE O/P EST MOD 30 MIN: CPT | Performed by: UROLOGY

## 2021-06-10 ASSESSMENT — ENCOUNTER SYMPTOMS
ABDOMINAL DISTENTION: 0
ABDOMINAL PAIN: 0

## 2021-06-10 NOTE — PROGRESS NOTES
Subjective:      Patient ID: Bhupinder Arzate is a 60 y.o. male. HPI This is a 59 yo male with h/o kidney stones (passed spontaneously yrs ago, last 2013) and back for evaluation of urinary retention causing bilateral hydronephrosis and DOV. When last seen on 5/25/21, he had a Phillips placed for a 750 cc PVR and no sensation of bladder fullness. Since last seen, he has no new complaints. I reviewed the interval renal U/A and BMP results today both showed improvement. He is on Flomax and Proscar. Past Medical History:   Diagnosis Date    Kidney stone      Past Surgical History:   Procedure Laterality Date    APPENDECTOMY       Social History     Socioeconomic History    Marital status:      Spouse name: None    Number of children: None    Years of education: None    Highest education level: None   Occupational History    None   Tobacco Use    Smoking status: Never Smoker    Smokeless tobacco: Never Used   Substance and Sexual Activity    Alcohol use: Never    Drug use: Never    Sexual activity: None   Other Topics Concern    None   Social History Narrative    None     Social Determinants of Health     Financial Resource Strain: Low Risk     Difficulty of Paying Living Expenses: Not hard at all   Food Insecurity: No Food Insecurity    Worried About Running Out of Food in the Last Year: Never true    [address] in the Last Year: Never true   Transportation Needs: No Transportation Needs    Lack of Transportation (Medical): No    Lack of Transportation (Non-Medical):  No   Physical Activity:     Days of Exercise per Week:     Minutes of Exercise per Session:    Stress:     Feeling of Stress :    Social Connections:     Frequency of Communication with Friends and Family:     Frequency of Social Gatherings with Friends and Family:     Attends Pentecostalism Services:     Active Member of Clubs or Organizations:     Attends Club or Organization Meetings:     Marital Status: MDRD formula (not corrected for weight), is valid for stable   renal function. GFR  51.3Low   >60 Final 06/07/2021  2:50 PM  - [facility]   >60 mL/min/1.73m2 EGFR, calc. for ages 25 and older using the   MDRD formula (not corrected for weight), is valid for stable   renal function. Calcium 9.6  8.5 - 9.9 mg/dL Final 06/07/2021  2:50 PM [address] [facility]   Testing Performed By    Enrrique Leal Name Director Address Valid Date Range   [address] [facility]        US RETROPERITONEAL COMPLETE  Status: Final result   Order Providers    Authorizing Encounter Billing   Hoda Strong MD [facility] 1 Luis Enrique Madison DO          Signed by    Signed Date/Time Phone Pager   Bernabe Gifty 6/08/2021  8:21 [phone]    Reading Providers    Read Date Phone Pager   D[address] Amaya [phone], Clay Cooper [address] Jun 8, 2021  8:21 [phone]    All Reviewers List    Hoda Strong MD on 6/8/2021 08:38   Routing History    Priority Sent On From To Message Type    6/8/2021  8:24 AM Dk, Chpo Incoming Radiant Results From Lyle Brower MD Results   Radiation Dose Estimates    No radiation information found for this patient   Narrative       COMPARISON: May 24, 2021       HISTORY: N13.30 Hydronephrosis, unspecified hydronephrosis type ICD10               TECHNIQUE: US RETROPERITONEAL COMPLETE       FINDINGS:   The right kidney measures  10.5 x 5.6 x 5 cm. There is mild hydronephrosis. . The left kidney measures 10.1 x 4.7 x 6.1 cm mild hydronephrosis is also seen. No renal calculus is visualized.       The prevoid bladder measures 364 mL and the post void shows complete emptying. There is again bladder wall trabeculation. Also the prostate gland protrudes into the urinary bladder. A Phillips catheter is seen in place. . .               Impression   Mild hydronephrosis seen bilaterally, right slightly greater than left.  It is improved when compared to previous study. A Phillips in place the bladder shows complete emptying on the post void. The prostate gland protrudes into the base of the    urinary bladder.         PSA   Date Value Ref Range Status   05/20/2021 5.17 0.00 - 5.40 ng/mL Final   04/02/2021 6.54 (H) 0.00 - 5.40 ng/mL Final     Comment:     When the Total PSA is between 3.00 and 10.00 ng/mL, consider  requesting a Free PSA to aid in diagnosis. Assessment: This is a 59 yo male with h/o kidney stones (passed spontaneously yrs ago, last 2013) and with BPH by exam and LUTs that have progressed over last few months and has DOV and bilateral hydronephrosis that have both improved since Phillips placemnent. Will also continue with close PSA follow-up in future and I recommend cystoscopy and UD when next seen for catheter change. The risks and benefits of  including but not limited to infection, pain, bleeding, stricture, retention, perforation, need for multiple procedures and he wants to proceed as planned. Plan:      1. F/U 2 weeks for office cystoscopy with urodynamics and catheter change  2.  BMP prior        Geovanna Stapleton MD

## 2021-06-23 ENCOUNTER — OFFICE VISIT (OUTPATIENT)
Dept: INTERNAL MEDICINE | Age: 66
End: 2021-06-23
Payer: COMMERCIAL

## 2021-06-23 VITALS
BODY MASS INDEX: 29.21 KG/M2 | SYSTOLIC BLOOD PRESSURE: 138 MMHG | HEIGHT: 69 IN | DIASTOLIC BLOOD PRESSURE: 86 MMHG | WEIGHT: 197.2 LBS | HEART RATE: 74 BPM | TEMPERATURE: 98.6 F | OXYGEN SATURATION: 98 %

## 2021-06-23 DIAGNOSIS — Z00.00 ANNUAL PHYSICAL EXAM: Primary | ICD-10-CM

## 2021-06-23 DIAGNOSIS — N13.30 HYDRONEPHROSIS, UNSPECIFIED HYDRONEPHROSIS TYPE: ICD-10-CM

## 2021-06-23 PROCEDURE — 99396 PREV VISIT EST AGE 40-64: CPT | Performed by: PHYSICIAN ASSISTANT

## 2021-06-23 ASSESSMENT — ENCOUNTER SYMPTOMS
RESPIRATORY NEGATIVE: 1
GASTROINTESTINAL NEGATIVE: 1

## 2021-06-23 NOTE — PROGRESS NOTES
21    Nohelia Mendosa (: 1955) is a 60 y.o. male, Established patient, here for a preventive medicine evaluation. HPI    Patient Active Problem List   Diagnosis    Elevated blood pressure reading    Frequent urination at night       Annual physical   No new concerns  Seeing Dr Patricia Fisher for hydronephrosis , wearing cath for one month       Review of Systems   Constitutional: Negative. HENT: Negative. Respiratory: Negative. Cardiovascular: Negative. Gastrointestinal: Negative. Genitourinary: Negative. Musculoskeletal: Negative. Neurological: Negative. Hematological: Negative. Psychiatric/Behavioral: Negative. Prior to Visit Medications    Medication Sig Taking? Authorizing Provider   finasteride (PROSCAR) 5 MG tablet Take 1 tablet by mouth daily Yes Lacie Ruby MD   tamsulosin (FLOMAX) 0.4 MG capsule Take 1 capsule by mouth daily Yes ERIC Reed        Allergies   Allergen Reactions    Bee Venom Anaphylaxis       Social History     Socioeconomic History    Marital status:      Spouse name: Not on file    Number of children: Not on file    Years of education: Not on file    Highest education level: Not on file   Occupational History    Not on file   Tobacco Use    Smoking status: Never Smoker    Smokeless tobacco: Never Used   Substance and Sexual Activity    Alcohol use: Never    Drug use: Never    Sexual activity: Not on file   Other Topics Concern    Not on file   Social History Narrative    Not on file     Social Determinants of Health     Financial Resource Strain: Low Risk     Difficulty of Paying Living Expenses: Not hard at all   Food Insecurity: No Food Insecurity    Worried About [address] in the Last Year: Never true    [address] in the Last Year: Never true   Transportation Needs: No Transportation Needs    Lack of Transportation (Medical): No    Lack of Transportation (Non-Medical):  No   Physical Activity:     Days of Exercise per Week:     Minutes of Exercise per Session:    Stress:     Feeling of Stress :    Social Connections:     Frequency of Communication with Friends and Family:     Frequency of Social Gatherings with Friends and Family:     Attends Cheondoism Services:     Active Member of Clubs or Organizations:     Attends Club or Organization Meetings:     Marital Status:    Intimate Partner Violence:     Fear of Current or Ex-Partner:     Emotionally Abused:     Physically Abused:     Sexually Abused:         ADVANCE DIRECTIVE: N, <no information>    Vitals:    06/23/21 1534   BP: 138/86   Site: Left Upper Arm   Position: Sitting   Cuff Size: Large Adult   Pulse: 74   Temp: 98.6 °F (37 °C)   TempSrc: Temporal   SpO2: 98%   Weight: 197 lb 3.2 oz (89.4 kg)   Height: 5' 9\" (1.753 m)       Physical Exam  Vitals reviewed. Constitutional:       Appearance: Normal appearance. HENT:      Head: Normocephalic and atraumatic. Eyes:      Extraocular Movements: Extraocular movements intact. Conjunctiva/sclera: Conjunctivae normal.      Pupils: Pupils are equal, round, and reactive to light. Cardiovascular:      Rate and Rhythm: Normal rate and regular rhythm. Pulses: Normal pulses. Heart sounds: Normal heart sounds. Pulmonary:      Effort: Pulmonary effort is normal.      Breath sounds: Normal breath sounds. Musculoskeletal:         General: Normal range of motion. Cervical back: Normal range of motion and neck supple. Skin:     General: Skin is warm. Neurological:      Mental Status: He is alert and oriented to person, place, and time. Psychiatric:         Mood and Affect: Mood normal.         Behavior: Behavior normal.         Thought Content:  Thought content normal.         Judgment: Judgment normal.           Lab Results   Component Value Date    CHOL 176 12/21/2020    TRIG 96 12/21/2020    HDL 45 12/21/2020    LDLCALC 112 12/21/2020    GLUCOSE 86

## 2021-06-25 ENCOUNTER — HOSPITAL ENCOUNTER (OUTPATIENT)
Dept: LAB | Age: 66
Discharge: HOME | End: 2021-06-25
Payer: COMMERCIAL

## 2021-06-25 DIAGNOSIS — Z00.00 ANNUAL PHYSICAL EXAM: ICD-10-CM

## 2021-06-25 LAB
ALBUMIN SERPL-MCNC: 4.3 G/DL (ref 3.5–4.6)
ALP BLD-CCNC: 93 U/L (ref 35–104)
ALT SERPL-CCNC: 18 U/L (ref 0–41)
AST SERPL-CCNC: 24 U/L (ref 0–40)
BASOPHILS ABSOLUTE: 0 K/UL (ref 0–0.2)
BASOPHILS RELATIVE PERCENT: 0.5 %
BILIRUB SERPL-MCNC: <0.2 MG/DL (ref 0.2–0.7)
BILIRUBIN DIRECT: <0.2 MG/DL (ref 0–0.4)
BILIRUBIN, INDIRECT: NORMAL MG/DL (ref 0–0.6)
CHOLESTEROL, TOTAL: 174 MG/DL (ref 0–199)
EOSINOPHILS ABSOLUTE: 0.3 K/UL (ref 0–0.7)
EOSINOPHILS RELATIVE PERCENT: 5 %
HCT VFR BLD CALC: 35.7 % (ref 42–52)
HDLC SERPL-MCNC: 42 MG/DL (ref 40–59)
HEMOGLOBIN: 12 G/DL (ref 14–18)
LDL CHOLESTEROL CALCULATED: 109 MG/DL (ref 0–129)
LYMPHOCYTES ABSOLUTE: 1.3 K/UL (ref 1–4.8)
LYMPHOCYTES RELATIVE PERCENT: 20.2 %
MCH RBC QN AUTO: 31.8 PG (ref 27–31.3)
MCHC RBC AUTO-ENTMCNC: 33.6 % (ref 33–37)
MCV RBC AUTO: 94.6 FL (ref 80–100)
MONOCYTES ABSOLUTE: 0.7 K/UL (ref 0.2–0.8)
MONOCYTES RELATIVE PERCENT: 10.3 %
NEUTROPHILS ABSOLUTE: 4.1 K/UL (ref 1.4–6.5)
NEUTROPHILS RELATIVE PERCENT: 64 %
PDW BLD-RTO: 13.8 % (ref 11.5–14.5)
PLATELET # BLD: 371 K/UL (ref 130–400)
RBC # BLD: 3.77 M/UL (ref 4.7–6.1)
TOTAL PROTEIN: 7.7 G/DL (ref 6.3–8)
TRIGL SERPL-MCNC: 115 MG/DL (ref 0–150)
WBC # BLD: 6.4 K/UL (ref 4.8–10.8)

## 2021-06-25 PROCEDURE — 80076 HEPATIC FUNCTION PANEL: CPT

## 2021-06-25 PROCEDURE — 85025 COMPLETE CBC W/AUTO DIFF WBC: CPT

## 2021-06-25 PROCEDURE — 80061 LIPID PANEL: CPT

## 2021-06-25 PROCEDURE — 36415 COLL VENOUS BLD VENIPUNCTURE: CPT

## 2021-06-30 ENCOUNTER — PROCEDURE VISIT (OUTPATIENT)
Dept: UROLOGY | Age: 66
End: 2021-06-30
Payer: COMMERCIAL

## 2021-06-30 VITALS
SYSTOLIC BLOOD PRESSURE: 136 MMHG | HEIGHT: 69 IN | WEIGHT: 195 LBS | BODY MASS INDEX: 28.88 KG/M2 | HEART RATE: 73 BPM | DIASTOLIC BLOOD PRESSURE: 82 MMHG

## 2021-06-30 DIAGNOSIS — N40.1 HYPERTROPHY OF PROSTATE WITH URINARY OBSTRUCTION: Primary | ICD-10-CM

## 2021-06-30 DIAGNOSIS — N13.8 HYPERTROPHY OF PROSTATE WITH URINARY OBSTRUCTION: Primary | ICD-10-CM

## 2021-06-30 LAB
BILIRUBIN, POC: ABNORMAL
BLOOD URINE, POC: ABNORMAL
CLARITY, POC: ABNORMAL
COLOR, POC: YELLOW
GLUCOSE URINE, POC: ABNORMAL
KETONES, POC: ABNORMAL
LEUKOCYTE EST, POC: ABNORMAL
NITRITE, POC: POSITIVE
PH, POC: 6
PROTEIN, POC: ABNORMAL
SPECIFIC GRAVITY, POC: 1.01
UROBILINOGEN, POC: 0.2

## 2021-06-30 PROCEDURE — 81003 URINALYSIS AUTO W/O SCOPE: CPT | Performed by: UROLOGY

## 2021-06-30 PROCEDURE — G8427 DOCREV CUR MEDS BY ELIG CLIN: HCPCS | Performed by: UROLOGY

## 2021-06-30 PROCEDURE — 3017F COLORECTAL CA SCREEN DOC REV: CPT | Performed by: UROLOGY

## 2021-06-30 PROCEDURE — 1036F TOBACCO NON-USER: CPT | Performed by: UROLOGY

## 2021-06-30 PROCEDURE — 99214 OFFICE O/P EST MOD 30 MIN: CPT | Performed by: UROLOGY

## 2021-06-30 PROCEDURE — G8419 CALC BMI OUT NRM PARAM NOF/U: HCPCS | Performed by: UROLOGY

## 2021-06-30 PROCEDURE — 51703 INSERT BLADDER CATH COMPLEX: CPT | Performed by: UROLOGY

## 2021-06-30 RX ORDER — CIPROFLOXACIN 250 MG/1
250 TABLET, FILM COATED ORAL 2 TIMES DAILY
Qty: 28 TABLET | Refills: 0 | Status: SHIPPED | OUTPATIENT
Start: 2021-06-30 | End: 2021-07-28

## 2021-06-30 ASSESSMENT — ENCOUNTER SYMPTOMS
ABDOMINAL DISTENTION: 0
ABDOMINAL PAIN: 0
SHORTNESS OF BREATH: 0

## 2021-06-30 NOTE — PROGRESS NOTES
Family:     Frequency of Social Gatherings with Friends and Family:     Attends Temple Services:     Active Member of Clubs or Organizations:     Attends Club or Organization Meetings:     Marital Status:    Intimate Partner Violence:     Fear of Current or Ex-Partner:     Emotionally Abused:     Physically Abused:     Sexually Abused:      Family History   Problem Relation Age of Onset    Stroke Father     Heart Surgery Brother      Current Outpatient Medications   Medication Sig Dispense Refill    finasteride (PROSCAR) 5 MG tablet Take 1 tablet by mouth daily 90 tablet 3    tamsulosin (FLOMAX) 0.4 MG capsule Take 1 capsule by mouth daily 90 capsule 1     No current facility-administered medications for this visit. Bee venom  reviewed      Review of Systems   Constitutional: Negative for unexpected weight change. Respiratory: Negative for shortness of breath. Cardiovascular: Negative for chest pain. Gastrointestinal: Negative for abdominal distention and abdominal pain. Genitourinary: Negative for flank pain and hematuria. Objective:   Physical Exam  Abdominal:      General: There is no distension. Palpations: Abdomen is soft. Genitourinary:     Penis: Normal.    Neurological:      Mental Status: He is alert.        6/30/2021 10:14 AM - Warner Beck CMA ([facility])    Component Collected Lab   Color, UA 06/30/2021 10:14 AM Unknown   yellow    Clarity, UA 06/30/2021 10:14 AM Unknown   cloudy    Glucose, UA POC 06/30/2021 10:14 AM Unknown   neg    Bilirubin, UA 06/30/2021 10:14 AM Unknown   neg    Ketones, UA 06/30/2021 10:14 AM Unknown   neg    Spec Grav, UA 06/30/2021 10:14 AM Unknown   1.015    Blood, UA POC 06/30/2021 10:14 AM Unknown   small    pH, UA 06/30/2021 10:14 AM Unknown   6.0    Protein, UA POC 06/30/2021 10:14 AM Unknown   trace    Urobilinogen, UA 06/30/2021 10:14 AM Unknown   0.2    Leukocytes, UA 06/30/2021 10:14 AM Unknown   large    Nitrite, UA 06/30/2021 10:14 AM Unknown   positive    Lab and Collection      Procedure: under sterile conditions, the prior catheter was removed and using a 2% Urojet, a 16 Fr Coude catheter was replaced with resistance at prostatic urethra. 10 cc were placed in balloon and it irrigated easily with 30 cc of sterile water to clear color. Assessment: This is a 59 yo male with h/o kidney stones (passed spontaneously yrs ago, last 2013) and with BPH by exam and LUTs that had progressed over last few months and had DOV and bilateral hydronephrosis that have both improved since Phillips placemnent. Will also continue with close PSA follow-up in future and I again recommend cystoscopy and UD when next seen for catheter change after treatment for UTI. The risks and benefits of  including but not limited to infection, pain, bleeding, stricture, retention, perforation, need for multiple procedures and he wants to proceed as planned. I also reviewed the proper dosing and SE of Cipro including but not limited to rash if in sun, tendon rupture, neuropathy, diarrhea/CDiff and to use pro-biotic, irreg HR and will proceed pending C/S results. Plan:      1. Urine for C/S and will call to let know if should start Cipro or change antibiotic as per Dr Coon Poag  2. Script for Cipro 250 mg po BID, #28 to use if C/S shows sens. Will take for 10 days and then save to resume 3 days prior to planned rescheduled cystoscopy and urodynamics on 7/26/21 Ade Savage is pt choice)  3. Will need repeat PSA prior to considering any BPH procedure  4.  BMP today        Eulalia Rollins MD

## 2021-07-03 LAB
ORGANISM: ABNORMAL
ORGANISM: ABNORMAL
URINE CULTURE, ROUTINE: ABNORMAL
URINE CULTURE, ROUTINE: ABNORMAL

## 2021-07-26 RX ORDER — CIPROFLOXACIN 250 MG/1
250 TABLET, FILM COATED ORAL 2 TIMES DAILY
Qty: 6 TABLET | Refills: 0 | Status: SHIPPED | OUTPATIENT
Start: 2021-07-26

## 2021-07-26 RX ORDER — CIPROFLOXACIN 250 MG/1
250 TABLET, FILM COATED ORAL 2 TIMES DAILY
Qty: 6 TABLET | Refills: 0 | Status: CANCELLED | OUTPATIENT
Start: 2021-07-26

## 2021-07-28 ENCOUNTER — PROCEDURE VISIT (OUTPATIENT)
Dept: UROLOGY | Age: 66
End: 2021-07-28
Payer: COMMERCIAL

## 2021-07-28 VITALS
WEIGHT: 195 LBS | DIASTOLIC BLOOD PRESSURE: 80 MMHG | HEIGHT: 69 IN | HEART RATE: 75 BPM | BODY MASS INDEX: 28.88 KG/M2 | SYSTOLIC BLOOD PRESSURE: 130 MMHG

## 2021-07-28 DIAGNOSIS — N40.1 HYPERTROPHY OF PROSTATE WITH URINARY OBSTRUCTION: ICD-10-CM

## 2021-07-28 DIAGNOSIS — R33.9 URINARY RETENTION: Primary | ICD-10-CM

## 2021-07-28 DIAGNOSIS — N13.8 HYPERTROPHY OF PROSTATE WITH URINARY OBSTRUCTION: ICD-10-CM

## 2021-07-28 PROCEDURE — 52000 CYSTOURETHROSCOPY: CPT | Performed by: UROLOGY

## 2021-07-28 PROCEDURE — 51741 ELECTRO-UROFLOWMETRY FIRST: CPT | Performed by: UROLOGY

## 2021-07-28 PROCEDURE — 51726 COMPLEX CYSTOMETROGRAM: CPT | Performed by: UROLOGY

## 2021-07-28 PROCEDURE — G8419 CALC BMI OUT NRM PARAM NOF/U: HCPCS | Performed by: UROLOGY

## 2021-07-28 PROCEDURE — 3017F COLORECTAL CA SCREEN DOC REV: CPT | Performed by: UROLOGY

## 2021-07-28 PROCEDURE — 1036F TOBACCO NON-USER: CPT | Performed by: UROLOGY

## 2021-07-28 PROCEDURE — 99214 OFFICE O/P EST MOD 30 MIN: CPT | Performed by: UROLOGY

## 2021-07-28 PROCEDURE — G8427 DOCREV CUR MEDS BY ELIG CLIN: HCPCS | Performed by: UROLOGY

## 2021-07-28 ASSESSMENT — ENCOUNTER SYMPTOMS
ABDOMINAL DISTENTION: 0
ABDOMINAL PAIN: 0

## 2021-07-28 NOTE — PROGRESS NOTES
Subjective:      Patient ID: Farida Izaguirre is a 60 y.o. male. HPI  This is a 59 yo male with h/o kidney stones (passed spontaneously yrs ago, last 2013) and back today for continued evaluation of urinary retention causing bilateral hydronephrosis and DOV (both improved since catheter placement). When last seen on 6/30/21, he was to have cystoscopy with U/D and was cancelled due to UTI. He has been using Cipro prior to today. He had a Phillips placed for a 750 cc PVR in 5/25/21 and no sensation of bladder fullness and there is concern about a hypotonic bladder. I had an extensive discussion about the plans for today and the information we are trying to obtain and he wants to proceed. He took Cipro today. He is on Flomax and Proscar.       Past Medical History:   Diagnosis Date    Kidney stone      Past Surgical History:   Procedure Laterality Date    APPENDECTOMY       Social History     Socioeconomic History    Marital status:      Spouse name: None    Number of children: None    Years of education: None    Highest education level: None   Occupational History    None   Tobacco Use    Smoking status: Never Smoker    Smokeless tobacco: Never Used   Substance and Sexual Activity    Alcohol use: Never    Drug use: Never    Sexual activity: None   Other Topics Concern    None   Social History Narrative    None     Social Determinants of Health     Financial Resource Strain: Low Risk     Difficulty of Paying Living Expenses: Not hard at all   Food Insecurity: No Food Insecurity    Worried About Running Out of Food in the Last Year: Never true    [address] in the Last Year: Never true   Transportation Needs: No Transportation Needs    Lack of Transportation (Medical): No    Lack of Transportation (Non-Medical):  No   Physical Activity:     Days of Exercise per Week:     Minutes of Exercise per Session:    Stress:     Feeling of Stress :    Social Connections:     Frequency of Communication with Friends and Family:     Frequency of Social Gatherings with Friends and Family:     Attends Protestant Services:     Active Member of Clubs or Organizations:     Attends Club or Organization Meetings:     Marital Status:    Intimate Partner Violence:     Fear of Current or Ex-Partner:     Emotionally Abused:     Physically Abused:     Sexually Abused:      Family History   Problem Relation Age of Onset    Stroke Father     Heart Surgery Brother      Current Outpatient Medications   Medication Sig Dispense Refill    ciprofloxacin (CIPRO) 250 MG tablet Take 1 tablet by mouth 2 times daily 6 tablet 0    finasteride (PROSCAR) 5 MG tablet Take 1 tablet by mouth daily 90 tablet 3    tamsulosin (FLOMAX) 0.4 MG capsule Take 1 capsule by mouth daily 90 capsule 1     No current facility-administered medications for this visit. Bee venom  reviewed    Review of Systems   Constitutional: Negative for fever. Gastrointestinal: Negative for abdominal distention and abdominal pain. Genitourinary: Negative for flank pain and hematuria. Objective:   Physical Exam  Constitutional:       Appearance: Normal appearance. Abdominal:      General: There is no distension. Palpations: Abdomen is soft. Neurological:      Mental Status: He is alert.    Psychiatric:         Mood and Affect: Mood normal.       US RETROPERITONEAL COMPLETE  Status: Final result   Order Providers    Authorizing Encounter Billing   Jamar Veronica MD [facility] 1 Chad DO Jaime          Signed by    Signed Date/Time Phone Pager   Bibi Spencer 6/08/2021  8:21 [phone]    Reading Providers    Read Date Phone Pager   Dano Amaya [id number] Jun 8, 2021  8:21 [phone]    All Reviewers List    Jamar Veronica MD on 6/8/2021 08:38   Routing History    Priority Sent On From To Message Type    6/8/2021  8:24 AM Cindy Root Incoming Radiant Results From [facility] Omar Martínez MD Results   Radiation Dose Estimates    No radiation information found for this patient   Narrative       COMPARISON: May 24, 2021       HISTORY: N13.30 Hydronephrosis, unspecified hydronephrosis type ICD10               TECHNIQUE: US RETROPERITONEAL COMPLETE       FINDINGS:   The right kidney measures  10.5 x 5.6 x 5 cm. There is mild hydronephrosis. . The left kidney measures 10.1 x 4.7 x 6.1 cm mild hydronephrosis is also seen. No renal calculus is visualized.       The prevoid bladder measures 364 mL and the post void shows complete emptying. There is again bladder wall trabeculation. Also the prostate gland protrudes into the urinary bladder. A Phillips catheter is seen in place. . .               Impression   Mild hydronephrosis seen bilaterally, right slightly greater than left. It is improved when compared to previous study. A Phillips in place the bladder shows complete emptying on the post void. The prostate gland protrudes into the base of the    urinary bladder. 6/30/2021 12:03 PM - Dk, Chpo Incoming Lab Results From [facility]    Component Value Ref Range & Units Status Collected Lab   Sodium 141  135 - 144 mEq/L Final 06/30/2021 11:03 AM [address] [facility]   Potassium 4.6  3.4 - 4.9 mEq/L Final 06/30/2021 11:03 AM [facility]   Chloride 104  95 - 107 mEq/L Final 06/30/2021 11:03 AM [facility]   CO2 26  20 - 31 mEq/L Final 06/30/2021 11:03 AM [facility]   Anion Gap 11  9 - 15 mEq/L Final 06/30/2021 11:03 AM [facility]   Glucose 106High   70 - 99 mg/dL Final 06/30/2021 11:03 AM [address] [facility]   BUN 21  8 - 23 mg/dL Final 06/30/2021 11:03 AM [facility]   CREATININE 1. 24High   0.70 - 1.20 mg/dL Final 06/30/2021 11:03 AM [facility]   GFR Non- 58.5Low   >60 Final 06/30/2021 11:03 AM [facility]   >60 mL/min/1.73m2 EGFR, calc. for ages 25 and older using the   MDRD formula (not corrected for weight), is valid for stable   renal function. PSA   Date Value Ref Range Status   05/20/2021 5.17 0.00 - 5.40 ng/mL Final   04/02/2021 6.54 (H) 0.00 - 5.40 ng/mL Final     Comment:     When the Total PSA is between 3.00 and 10.00 ng/mL, consider  requesting a Free PSA to aid in diagnosis. Cystoscopy Procedure Note    Pre-operative Diagnosis: Urinary retention    Post-operative Diagnosis: Same    PROCEDURE: Flexible cystoscopy with complex CMG and Uroflow    Surgeon: Bridgette Light MD     Assistants: Haroon Dalton MA    Anesthesia: Local anesthesia topical 2% lidocaine gel    Procedure Details   The risks, benefits, complications, treatment options, and expected outcomes were discussed with the patient. The patient concurred with the proposed plan, giving informed consent. Cystoscopy and Urodynamics was performed today under local anesthesia, using sterile technique. The patient was placed in the supine position, prepped and draped in the usual sterile fashion. A flexible cystoscope was used to inspect the entire bladder including retroflexion. Findings:  Anterior urethra: normal  Prostatic Urethra:normal mucosa with significant bi-lobar hypertrophy of prostate and lateral lobe occlusion with mild median lobe enlargement. Prostatic urethral length was 2 cm. Bladder: Normal mucosa with diffuse trabeculation and no tumors stones, clots or FB  Ureteral orifice(s) were normal . Ureteral orifice(s) were in the normal location. Urodynamics: sensation of urge to void was at about 500 cc and there was no DI. He did flow with flow rate of 6 ml/sec but only about 100 cc voided and about a 400 cc PVR with 16 Fr coude catheter replacement. The Pdet at max flow was high and ranged in the 100 to 200 cm H20 range, confirming detrusor function but obstruction. Specimens: None                 Complications:  None; patient tolerated the procedure well. Disposition:  To home.           Condition: stable    Assessment: This is a 59 yo male with h/o kidney stones (passed spontaneously yrs ago, last 2013) and back today for continued evaluation of urinary retention causing bilateral hydronephrosis and DOV that has almost resolved by last Creat and based on today's evaluation has significant prostate enlargement as the likely cause for the prior findings and the bladder is functioning without obvious evidence for hypotonicity. I recommend given the prostate size that he consider a Holep procedure. Will need catheter change in 4 weeks and he is aware. Plan:      1. PSA and Creat in 2 weeks and will call for results  2.  Refer to Dr Joan Portillo for Shubham Knowles MD

## 2021-08-30 ENCOUNTER — HOSPITAL ENCOUNTER (OUTPATIENT)
Dept: LAB | Age: 66
Discharge: HOME | End: 2021-08-30
Payer: COMMERCIAL

## 2021-08-30 DIAGNOSIS — R33.9 URINARY RETENTION: ICD-10-CM

## 2021-08-30 LAB
CREAT SERPL-MCNC: 1.28 MG/DL (ref 0.7–1.2)
GFR AFRICAN AMERICAN: >60
GFR NON-AFRICAN AMERICAN: 56.4
PROSTATE SPECIFIC ANTIGEN: 2.5 NG/ML (ref 0–4)

## 2021-08-30 PROCEDURE — 82565 ASSAY OF CREATININE: CPT

## 2021-08-30 PROCEDURE — 36415 COLL VENOUS BLD VENIPUNCTURE: CPT

## 2021-08-30 PROCEDURE — 84153 ASSAY OF PSA TOTAL: CPT

## 2023-03-14 LAB
CREATININE (MG/DL) IN SER/PLAS: 1.2 MG/DL (ref 0.5–1.3)
GFR MALE: 66 ML/MIN/1.73M2
PROSTATE SPECIFIC AG (NG/ML) IN SER/PLAS: 2.47 NG/ML (ref 0–4)
UREA NITROGEN (MG/DL) IN SER/PLAS: 19 MG/DL (ref 6–23)

## 2023-05-09 ENCOUNTER — OFFICE VISIT (OUTPATIENT)
Dept: FAMILY MEDICINE CLINIC | Age: 68
End: 2023-05-09
Payer: COMMERCIAL

## 2023-05-09 VITALS
HEIGHT: 70 IN | SYSTOLIC BLOOD PRESSURE: 136 MMHG | BODY MASS INDEX: 29.01 KG/M2 | HEART RATE: 71 BPM | TEMPERATURE: 97.8 F | WEIGHT: 202.6 LBS | DIASTOLIC BLOOD PRESSURE: 88 MMHG | OXYGEN SATURATION: 97 %

## 2023-05-09 DIAGNOSIS — T63.441A LOCAL REACTION TO BEE STING, ACCIDENTAL OR UNINTENTIONAL, INITIAL ENCOUNTER: Primary | ICD-10-CM

## 2023-05-09 PROCEDURE — G8419 CALC BMI OUT NRM PARAM NOF/U: HCPCS | Performed by: NURSE PRACTITIONER

## 2023-05-09 PROCEDURE — 1036F TOBACCO NON-USER: CPT | Performed by: NURSE PRACTITIONER

## 2023-05-09 PROCEDURE — 99213 OFFICE O/P EST LOW 20 MIN: CPT | Performed by: NURSE PRACTITIONER

## 2023-05-09 PROCEDURE — G8427 DOCREV CUR MEDS BY ELIG CLIN: HCPCS | Performed by: NURSE PRACTITIONER

## 2023-05-09 PROCEDURE — 3017F COLORECTAL CA SCREEN DOC REV: CPT | Performed by: NURSE PRACTITIONER

## 2023-05-09 PROCEDURE — 1123F ACP DISCUSS/DSCN MKR DOCD: CPT | Performed by: NURSE PRACTITIONER

## 2023-05-09 RX ORDER — SULFAMETHOXAZOLE AND TRIMETHOPRIM 800; 160 MG/1; MG/1
1 TABLET ORAL 2 TIMES DAILY
Qty: 14 TABLET | Refills: 0 | Status: SHIPPED | OUTPATIENT
Start: 2023-05-09 | End: 2023-05-16

## 2023-05-09 RX ORDER — PREDNISONE 50 MG/1
50 TABLET ORAL DAILY
Qty: 5 TABLET | Refills: 0 | Status: SHIPPED | OUTPATIENT
Start: 2023-05-09 | End: 2023-05-14

## 2023-05-09 SDOH — ECONOMIC STABILITY: FOOD INSECURITY: WITHIN THE PAST 12 MONTHS, THE FOOD YOU BOUGHT JUST DIDN'T LAST AND YOU DIDN'T HAVE MONEY TO GET MORE.: NEVER TRUE

## 2023-05-09 SDOH — ECONOMIC STABILITY: HOUSING INSECURITY
IN THE LAST 12 MONTHS, WAS THERE A TIME WHEN YOU DID NOT HAVE A STEADY PLACE TO SLEEP OR SLEPT IN A SHELTER (INCLUDING NOW)?: NO

## 2023-05-09 SDOH — ECONOMIC STABILITY: FOOD INSECURITY: WITHIN THE PAST 12 MONTHS, YOU WORRIED THAT YOUR FOOD WOULD RUN OUT BEFORE YOU GOT MONEY TO BUY MORE.: NEVER TRUE

## 2023-05-09 SDOH — ECONOMIC STABILITY: INCOME INSECURITY: HOW HARD IS IT FOR YOU TO PAY FOR THE VERY BASICS LIKE FOOD, HOUSING, MEDICAL CARE, AND HEATING?: NOT HARD AT ALL

## 2023-05-09 ASSESSMENT — PATIENT HEALTH QUESTIONNAIRE - PHQ9
SUM OF ALL RESPONSES TO PHQ QUESTIONS 1-9: 0
1. LITTLE INTEREST OR PLEASURE IN DOING THINGS: 0
SUM OF ALL RESPONSES TO PHQ QUESTIONS 1-9: 0
SUM OF ALL RESPONSES TO PHQ9 QUESTIONS 1 & 2: 0
2. FEELING DOWN, DEPRESSED OR HOPELESS: 0

## 2023-05-09 NOTE — PROGRESS NOTES
which require immediate follow-up in ED/call to 911. Understanding verbalized. I have reviewed the patient's medical history in detail and updated the computerized patient record.     Kate De La Rosa, LOCO - CNP

## 2023-05-12 ASSESSMENT — ENCOUNTER SYMPTOMS
NAUSEA: 0
DIARRHEA: 0
WHEEZING: 0
COUGH: 0
RHINORRHEA: 0
SHORTNESS OF BREATH: 0
VOMITING: 0
SORE THROAT: 0
TROUBLE SWALLOWING: 0

## 2023-06-19 ENCOUNTER — TELEPHONE (OUTPATIENT)
Dept: INTERNAL MEDICINE | Age: 68
End: 2023-06-19

## 2023-06-21 LAB — PROSTATE SPECIFIC AG (NG/ML) IN SER/PLAS: 2.48 NG/ML (ref 0–4)

## 2023-06-30 ENCOUNTER — OFFICE VISIT (OUTPATIENT)
Dept: INTERNAL MEDICINE | Age: 68
End: 2023-06-30
Payer: COMMERCIAL

## 2023-06-30 VITALS
DIASTOLIC BLOOD PRESSURE: 86 MMHG | HEIGHT: 69 IN | RESPIRATION RATE: 16 BRPM | BODY MASS INDEX: 29.62 KG/M2 | WEIGHT: 200 LBS | TEMPERATURE: 97.1 F | OXYGEN SATURATION: 98 % | SYSTOLIC BLOOD PRESSURE: 124 MMHG | HEART RATE: 78 BPM

## 2023-06-30 DIAGNOSIS — Z13.220 NEED FOR LIPID SCREENING: ICD-10-CM

## 2023-06-30 DIAGNOSIS — Z00.00 ENCOUNTER FOR WELL ADULT EXAM WITHOUT ABNORMAL FINDINGS: Primary | ICD-10-CM

## 2023-06-30 DIAGNOSIS — Z13.1 SCREENING FOR DIABETES MELLITUS: ICD-10-CM

## 2023-06-30 DIAGNOSIS — Z00.00 ENCOUNTER FOR WELL ADULT EXAM WITHOUT ABNORMAL FINDINGS: ICD-10-CM

## 2023-06-30 LAB
ALBUMIN SERPL-MCNC: 4.4 G/DL (ref 3.5–4.6)
ALP SERPL-CCNC: 71 U/L (ref 35–104)
ALT SERPL-CCNC: 15 U/L (ref 0–41)
ANION GAP SERPL CALCULATED.3IONS-SCNC: 12 MEQ/L (ref 9–15)
AST SERPL-CCNC: 23 U/L (ref 0–40)
BASOPHILS # BLD: 0 K/UL (ref 0–0.2)
BASOPHILS NFR BLD: 0.5 %
BILIRUB SERPL-MCNC: 0.5 MG/DL (ref 0.2–0.7)
BUN SERPL-MCNC: 22 MG/DL (ref 8–23)
CALCIUM SERPL-MCNC: 9.5 MG/DL (ref 8.5–9.9)
CHLORIDE SERPL-SCNC: 105 MEQ/L (ref 95–107)
CHOLEST SERPL-MCNC: 192 MG/DL (ref 0–199)
CO2 SERPL-SCNC: 24 MEQ/L (ref 20–31)
CREAT SERPL-MCNC: 1.04 MG/DL (ref 0.7–1.2)
EOSINOPHIL # BLD: 0.4 K/UL (ref 0–0.7)
EOSINOPHIL NFR BLD: 6.1 %
ERYTHROCYTE [DISTWIDTH] IN BLOOD BY AUTOMATED COUNT: 13.2 % (ref 11.5–14.5)
GLOBULIN SER CALC-MCNC: 3.2 G/DL (ref 2.3–3.5)
GLUCOSE SERPL-MCNC: 91 MG/DL (ref 70–99)
HBA1C MFR BLD: 5.9 % (ref 4.8–5.9)
HCT VFR BLD AUTO: 43 % (ref 42–52)
HDLC SERPL-MCNC: 44 MG/DL (ref 40–59)
HGB BLD-MCNC: 14.1 G/DL (ref 14–18)
LDLC SERPL CALC-MCNC: 135 MG/DL (ref 0–129)
LYMPHOCYTES # BLD: 1.8 K/UL (ref 1–4.8)
LYMPHOCYTES NFR BLD: 29.7 %
MCH RBC QN AUTO: 30.8 PG (ref 27–31.3)
MCHC RBC AUTO-ENTMCNC: 32.9 % (ref 33–37)
MCV RBC AUTO: 93.7 FL (ref 79–92.2)
MONOCYTES # BLD: 0.5 K/UL (ref 0.2–0.8)
MONOCYTES NFR BLD: 9.1 %
NEUTROPHILS # BLD: 3.2 K/UL (ref 1.4–6.5)
NEUTS SEG NFR BLD: 54.6 %
PLATELET # BLD AUTO: 283 K/UL (ref 130–400)
POTASSIUM SERPL-SCNC: 4.7 MEQ/L (ref 3.4–4.9)
PROT SERPL-MCNC: 7.6 G/DL (ref 6.3–8)
RBC # BLD AUTO: 4.59 M/UL (ref 4.7–6.1)
SODIUM SERPL-SCNC: 141 MEQ/L (ref 135–144)
TRIGL SERPL-MCNC: 66 MG/DL (ref 0–150)
WBC # BLD AUTO: 5.9 K/UL (ref 4.8–10.8)

## 2023-06-30 PROCEDURE — 99397 PER PM REEVAL EST PAT 65+ YR: CPT | Performed by: PHYSICIAN ASSISTANT

## 2023-07-11 PROBLEM — E78.5 DYSLIPIDEMIA: Status: ACTIVE | Noted: 2023-07-11

## 2023-07-11 PROBLEM — R73.03 PRE-DIABETES: Status: ACTIVE | Noted: 2023-07-11

## 2023-12-15 ENCOUNTER — APPOINTMENT (OUTPATIENT)
Dept: UROLOGY | Facility: HOSPITAL | Age: 68
End: 2023-12-15

## 2024-01-10 ENCOUNTER — LAB (OUTPATIENT)
Dept: LAB | Facility: LAB | Age: 69
End: 2024-01-10
Payer: MEDICARE

## 2024-01-10 DIAGNOSIS — C61 MALIGNANT NEOPLASM OF PROSTATE (MULTI): Primary | ICD-10-CM

## 2024-01-10 LAB — PSA SERPL-MCNC: 2.58 NG/ML

## 2024-01-10 PROCEDURE — 36415 COLL VENOUS BLD VENIPUNCTURE: CPT

## 2024-01-10 PROCEDURE — 84153 ASSAY OF PSA TOTAL: CPT

## 2024-01-17 ENCOUNTER — TELEMEDICINE (OUTPATIENT)
Dept: UROLOGY | Facility: HOSPITAL | Age: 69
End: 2024-01-17
Payer: MEDICARE

## 2024-01-17 DIAGNOSIS — C61 PROSTATE CA (MULTI): Primary | ICD-10-CM

## 2024-01-17 DIAGNOSIS — C61 PROSTATE CA (MULTI): ICD-10-CM

## 2024-01-17 PROCEDURE — 99213 OFFICE O/P EST LOW 20 MIN: CPT | Performed by: UROLOGY

## 2024-01-17 NOTE — PROGRESS NOTES
Virtual or Telephone Consent    A telephone visit (audio only) between the patient (at the originating site) and the provider (at the distant site) was utilized to provide this telehealth service.   Verbal consent was requested and obtained from Ricardo Ernandez on this date, 01/17/24 for a telehealth visit.     HPI  68 y.o. male s/p outpatient HoLEP 10/22/21 for chronic urinary retention, luo dependence, unknown prostate volume.     Seen 10/25/21 for TOV. No issues since surgery, TOV passed.      Path - 26g, T1a Gl6 PCa     11/11/21 - dry, stream remarkably strong. no pain. urine clear. thrilled with his outcome. PVR 14cc      2/10/22 - PVR 28cc. Dry. Stream strong. Normal urinary function, thrilled with his outcome.      2/16/22 - follows up over THV. still urinating well.      5/25/22 - follows up with PSA prior. voiding well. no urgency at all.      12/14/22 MRI prostate - PI-RADS 3 lesion in the anterior apex to mid transition zone.     12/22/22 - Presents today to discuss MRI results. Voiding well, no concerns.      6/30/23 - Presents for a 3 month follow up virtual visit. continues to void very well.          1/17/24 - seen today via thv to discuss 6mo PSA. Continues to void well, no interval changes      Lab Results   Component Value Date    PSA 2.58 01/10/2024    PSA 2.48 06/21/2023    PSA 2.47 03/14/2023    PSA 2.33 12/07/2022    PSA 1.65 05/17/2022         Current Medications:  No current outpatient medications on file.     No current facility-administered medications for this visit.        Active Problems:  Ricardo Ernandez is a 68 y.o. male with the following Problems and Medications.  There is no problem list on file for this patient.    No current outpatient medications on file.     No current facility-administered medications for this visit.       PMH:  Past Medical History:   Diagnosis Date    Other specified health status     No pertinent past medical history       PSH:  Past Surgical History:    Procedure Laterality Date    OTHER SURGICAL HISTORY  08/12/2021    Finger surgical procedure    OTHER SURGICAL HISTORY  08/12/2021    Appendectomy       [facility]:  No family history on file.    Allergies:  Not on File    Assessment/Plan    Psa continues to be stable, MRI last year with just a small PIRADS3 lesion. Recommend continuing with active surveillance at this time. Will check again in 6 mo. If going up, would repeat MRI at some point.      Scribe Attestation  By signing my name below, I, Martha Escobedo, Scribe, attest that this documentation  has been prepared under the direction and in the presence of Iván Martin MD.

## 2024-07-12 ENCOUNTER — LAB (OUTPATIENT)
Dept: LAB | Facility: LAB | Age: 69
End: 2024-07-12
Payer: MEDICARE

## 2024-07-12 DIAGNOSIS — C61 PROSTATE CA (MULTI): ICD-10-CM

## 2024-07-12 LAB — PSA SERPL-MCNC: 3.24 NG/ML

## 2024-07-12 PROCEDURE — 36415 COLL VENOUS BLD VENIPUNCTURE: CPT

## 2024-07-12 PROCEDURE — 84153 ASSAY OF PSA TOTAL: CPT

## 2024-07-17 ENCOUNTER — APPOINTMENT (OUTPATIENT)
Dept: UROLOGY | Facility: HOSPITAL | Age: 69
End: 2024-07-17
Payer: MEDICARE

## 2024-07-25 NOTE — PROGRESS NOTES
Virtual or Telephone Consent    A telephone visit (audio only) between the patient (at the originating site) and the provider (at the distant site) was utilized to provide this telehealth service.   Verbal consent was requested and obtained from Ricardo Ernandez on this date, 07/26/24 for a telehealth visit.     HPI    68 y.o. male being seen with the following problem list:    Problem list:  Chronic urinary retention, luo dependence, unknown prostate volume, now s/p outpatient HoLEP 10/22/21      Seen 10/25/21 for TOV. No issues since surgery, TOV passed.      Path - 26g, T1a Gl6 PCa     11/11/21 - dry, stream remarkably strong. no pain. urine clear. thrilled with his outcome. PVR 14cc      2/10/22 - PVR 28cc. Dry. Stream strong. Normal urinary function, thrilled with his outcome.      2/16/22 - follows up over THV. still urinating well.      5/25/22 - follows up with PSA prior. voiding well. no urgency at all.      12/14/22 MRI prostate - PI-RADS 3 lesion in the anterior apex to mid transition zone.     12/22/22 - Presents today to discuss MRI results. Voiding well, no concerns.      6/30/23 - Presents for a 3 month follow up virtual visit. continues to void very well.          1/17/24 - seen today via thv to discuss 6mo PSA. Continues to void well, no interval changes        7/26/24 - seen over th to review 6mo PSA. No interval changes.      Lab Results   Component Value Date    PSA 3.24 07/12/2024    PSA 2.58 01/10/2024    PSA 2.48 06/21/2023    PSA 2.47 03/14/2023    PSA 2.33 12/07/2022         Current Medications:  No current outpatient medications on file.     No current facility-administered medications for this visit.        Active Problems:  Ricardo Ernandez is a 68 y.o. male with the following Problems and Medications.  There is no problem list on file for this patient.    No current outpatient medications on file.     No current facility-administered medications for this visit.       PMH:  Past Medical  History:   Diagnosis Date    Other specified health status     No pertinent past medical history       PSH:  Past Surgical History:   Procedure Laterality Date    OTHER SURGICAL HISTORY  08/12/2021    Finger surgical procedure    OTHER SURGICAL HISTORY  08/12/2021    Appendectomy       FMH:  No family history on file.    Allergies:  Not on File    Assessment/Plan  PSA has went up to 3.24 from 2.58 in January. We discussed repeating in 6 weeks for now. If still elevated, would then proceed with another MRI. Follow up in 6 weeks via thv with a PSA prior.      Scribe Attestation  By signing my name below, I, Lissy Conway, attest that this documentation  has been prepared under the direction and in the presence of Iván Martin MD.

## 2024-07-26 ENCOUNTER — TELEMEDICINE (OUTPATIENT)
Dept: UROLOGY | Facility: HOSPITAL | Age: 69
End: 2024-07-26
Payer: MEDICARE

## 2024-07-26 DIAGNOSIS — C61 PROSTATE CA (MULTI): ICD-10-CM

## 2024-07-26 PROCEDURE — 99214 OFFICE O/P EST MOD 30 MIN: CPT | Performed by: UROLOGY

## 2024-07-26 PROCEDURE — G2211 COMPLEX E/M VISIT ADD ON: HCPCS | Performed by: UROLOGY

## 2024-09-04 ENCOUNTER — LAB (OUTPATIENT)
Dept: LAB | Facility: LAB | Age: 69
End: 2024-09-04
Payer: MEDICARE

## 2024-09-04 DIAGNOSIS — C61 PROSTATE CA (MULTI): ICD-10-CM

## 2024-09-04 LAB — PSA SERPL-MCNC: 2.79 NG/ML

## 2024-09-04 PROCEDURE — 84153 ASSAY OF PSA TOTAL: CPT

## 2024-09-04 PROCEDURE — 36415 COLL VENOUS BLD VENIPUNCTURE: CPT

## 2024-09-12 ENCOUNTER — TELEMEDICINE (OUTPATIENT)
Dept: UROLOGY | Facility: HOSPITAL | Age: 69
End: 2024-09-12
Payer: MEDICARE

## 2024-09-12 DIAGNOSIS — C61 PROSTATE CA (MULTI): ICD-10-CM

## 2024-09-12 PROCEDURE — 99213 OFFICE O/P EST LOW 20 MIN: CPT | Performed by: UROLOGY

## 2024-09-12 PROCEDURE — G2211 COMPLEX E/M VISIT ADD ON: HCPCS | Performed by: UROLOGY

## 2024-09-12 NOTE — PROGRESS NOTES
Virtual or Telephone Consent    A telephone visit (audio only) between the patient (at the originating site) and the provider (at the distant site) was utilized to provide this telehealth service.   Verbal consent was requested and obtained from Ricardo Ernandez on this date, 09/12/24 for a telehealth visit.     HPI    69 y.o. male being seen with the following problem list:    Problem list:  1.Chronic urinary retention, luo dependence, unknown prostate volume, now s/p outpatient HoLEP 10/22/21. Path - 26g, T1a Gl6 PCa  2. Incidental low risk PCa as above, on active surveillance    Seen 10/25/21 for TOV. No issues since surgery, TOV passed.           11/11/21 - dry, stream remarkably strong. no pain. urine clear. thrilled with his outcome. PVR 14cc      2/10/22 - PVR 28cc. Dry. Stream strong. Normal urinary function, thrilled with his outcome.      2/16/22 - follows up over THV. still urinating well.      5/25/22 - follows up with PSA prior. voiding well. no urgency at all.      12/14/22 MRI prostate - PI-RADS 3 lesion in the anterior apex to mid transition zone.     12/22/22 - Presents today to discuss MRI results. Voiding well, no concerns.      6/30/23 - Presents for a 3 month follow up virtual visit. continues to void very well.      1/17/24 - seen today via thv to discuss 6mo PSA. Continues to void well, no interval changes     7/26/24 - seen over th to review 6mo PSA. No interval changes.    09/12/24 - Seen over TH to review PSA results.      Lab Results   Component Value Date    PSA 2.79 09/04/2024    PSA 3.24 07/12/2024    PSA 2.58 01/10/2024    PSA 2.48 06/21/2023    PSA 2.47 03/14/2023         Current Medications:  No current outpatient medications on file.     No current facility-administered medications for this visit.        Active Problems:  Ricardo Ernandez is a 69 y.o. male with the following Problems and Medications.  Patient Active Problem List   Diagnosis    Prostate CA (Multi)     No current  outpatient medications on file.     No current facility-administered medications for this visit.       PMH:  Past Medical History:   Diagnosis Date    Other specified health status     No pertinent past medical history       PSH:  Past Surgical History:   Procedure Laterality Date    OTHER SURGICAL HISTORY  08/12/2021    Finger surgical procedure    OTHER SURGICAL HISTORY  08/12/2021    Appendectomy       FMH:  No family history on file.    SHx:       Allergies:  Not on File    Assessment/Plan    Discussed his PSA, which is back to baseline. Will repeat PSA in 6 months to continue on active surveillance    Follow up in 6 months with PSA prior.       Scribe Attestation  By signing my name below, I, Lissy Conway, attest that this documentation  has been prepared under the direction and in the presence of Iván Martin MD.

## 2024-09-16 ENCOUNTER — OFFICE VISIT (OUTPATIENT)
Dept: INTERNAL MEDICINE | Age: 69
End: 2024-09-16
Payer: MEDICARE

## 2024-09-16 VITALS
WEIGHT: 199 LBS | SYSTOLIC BLOOD PRESSURE: 154 MMHG | RESPIRATION RATE: 16 BRPM | TEMPERATURE: 97.7 F | DIASTOLIC BLOOD PRESSURE: 82 MMHG | OXYGEN SATURATION: 99 % | BODY MASS INDEX: 29.39 KG/M2 | HEART RATE: 67 BPM

## 2024-09-16 DIAGNOSIS — L23.7 POISON IVY DERMATITIS: Primary | ICD-10-CM

## 2024-09-16 PROCEDURE — 1123F ACP DISCUSS/DSCN MKR DOCD: CPT | Performed by: STUDENT IN AN ORGANIZED HEALTH CARE EDUCATION/TRAINING PROGRAM

## 2024-09-16 PROCEDURE — 99213 OFFICE O/P EST LOW 20 MIN: CPT | Performed by: STUDENT IN AN ORGANIZED HEALTH CARE EDUCATION/TRAINING PROGRAM

## 2024-09-16 RX ORDER — PREDNISONE 20 MG/1
20 TABLET ORAL 2 TIMES DAILY
Qty: 10 TABLET | Refills: 0 | Status: SHIPPED | OUTPATIENT
Start: 2024-09-16 | End: 2024-09-21

## 2024-09-16 RX ORDER — AMLODIPINE BESYLATE 5 MG/1
5 TABLET ORAL DAILY
COMMUNITY
Start: 2024-04-11 | End: 2025-04-06

## 2024-09-16 RX ORDER — HYDROCORTISONE 2.5 %
CREAM (GRAM) TOPICAL
Qty: 453.6 G | Refills: 1 | Status: SHIPPED | OUTPATIENT
Start: 2024-09-16

## 2024-09-16 ASSESSMENT — PATIENT HEALTH QUESTIONNAIRE - PHQ9
SUM OF ALL RESPONSES TO PHQ9 QUESTIONS 1 & 2: 0
SUM OF ALL RESPONSES TO PHQ QUESTIONS 1-9: 0
SUM OF ALL RESPONSES TO PHQ QUESTIONS 1-9: 0
1. LITTLE INTEREST OR PLEASURE IN DOING THINGS: NOT AT ALL
SUM OF ALL RESPONSES TO PHQ QUESTIONS 1-9: 0
2. FEELING DOWN, DEPRESSED OR HOPELESS: NOT AT ALL
SUM OF ALL RESPONSES TO PHQ QUESTIONS 1-9: 0

## 2024-09-16 ASSESSMENT — ENCOUNTER SYMPTOMS
ABDOMINAL PAIN: 0
SHORTNESS OF BREATH: 0

## 2025-03-04 LAB — PSA SERPL-MCNC: 3.11 NG/ML

## 2025-03-10 NOTE — PROGRESS NOTES
HPI    69 y.o. male being seen with the following problem list:    Problem list:  Chronic urinary retention, luo dependence, unknown prostate volume, now s/p outpatient HoLEP 10/22/21. Path - 26g, T1a Gl6 PCa  Incidental low risk PCa as above, on active surveillance     Seen 10/25/21 for TOV. No issues since surgery, TOV passed.      11/11/21 - dry, stream remarkably strong. no pain. urine clear. thrilled with his outcome. PVR 14cc      2/10/22 - PVR 28cc. Dry. Stream strong. Normal urinary function, thrilled with his outcome.      2/16/22 - follows up over THV. still urinating well.      5/25/22 - follows up with PSA prior. voiding well. no urgency at all.      12/14/22 MRI prostate - PI-RADS 3 lesion in the anterior apex to mid transition zone.     12/22/22 - Presents today to discuss MRI results. Voiding well, no concerns.      6/30/23 - Presents for a 3 month follow up virtual visit. continues to void very well.      1/17/24 - seen today via thv to discuss 6mo PSA. Continues to void well, no interval changes     7/26/24 - seen over th to review 6mo PSA. No interval changes.     09/12/24 - Seen over TH to review PSA results.    03/12/25 - Seen today over telehealth, performed this visit using real-time telehealth tools, including an audio/video connection between Ricardo Ernandez at home and Iván Martin MD at [facility] [place]. Consent for telemedicine visit was obtained.   Doing well symptomatically. Here to review PSA.       Lab Results   Component Value Date    PSA 3.11 03/03/2025    PSA 2.79 09/04/2024    PSA 3.24 07/12/2024    PSA 2.58 01/10/2024    PSA 2.48 06/21/2023              Current Medications:  No current outpatient medications on file.     No current facility-administered medications for this visit.        Active Problems:  Ricardo Ernandez is a 69 y.o. male with the following Problems and Medications.  Patient Active Problem List   Diagnosis    Prostate CA (Multi)     No current outpatient  medications on file.     No current facility-administered medications for this visit.       PMH:  Past Medical History:   Diagnosis Date    Other specified health status     No pertinent past medical history       PSH:  Past Surgical History:   Procedure Laterality Date    OTHER SURGICAL HISTORY  08/12/2021    Finger surgical procedure    OTHER SURGICAL HISTORY  08/12/2021    Appendectomy       FMH:  No family history on file.    SHx:       Allergies:  Not on File  Assessment/Plan  Elevated PSA, but has been in this range before. Will continue to monitor for now, will repeat PSA in 6 months.     Urinary symptoms remain excellent.    FU in 6 months with PSA prior    Scribe Attestation  By signing my name below, I, Lissy Somers, attest that this documentation has been prepared under the direction and in the presence of Iván Martin MD.

## 2025-03-12 ENCOUNTER — TELEMEDICINE (OUTPATIENT)
Dept: UROLOGY | Facility: HOSPITAL | Age: 70
End: 2025-03-12
Payer: MEDICARE

## 2025-03-12 DIAGNOSIS — C61 PROSTATE CA (MULTI): ICD-10-CM

## 2025-03-12 PROCEDURE — G2211 COMPLEX E/M VISIT ADD ON: HCPCS | Performed by: UROLOGY

## 2025-03-12 PROCEDURE — 99213 OFFICE O/P EST LOW 20 MIN: CPT | Performed by: UROLOGY

## 2025-04-30 ENCOUNTER — TELEPHONE (OUTPATIENT)
Dept: INTERNAL MEDICINE | Age: 70
End: 2025-04-30

## 2025-06-12 DIAGNOSIS — C61 PROSTATE CA (MULTI): ICD-10-CM

## 2025-07-31 ENCOUNTER — TELEPHONE (OUTPATIENT)
Dept: GASTROENTEROLOGY | Age: 70
End: 2025-07-31

## 2025-07-31 DIAGNOSIS — Z12.11 COLON CANCER SCREENING: Primary | ICD-10-CM

## 2025-07-31 NOTE — TELEPHONE ENCOUNTER
Spoke to patient, agreeable to colonoscopy. Patient scheduled 9/8/2025 at 10:30 AM. Miralax Prep mailed to patient. Referral pended to PCP. Info faxed to [facility].

## 2025-08-13 ENCOUNTER — COMMUNITY OUTREACH (OUTPATIENT)
Dept: INTERNAL MEDICINE | Age: 70
End: 2025-08-13

## 2025-08-21 ENCOUNTER — TELEMEDICINE (OUTPATIENT)
Age: 70
End: 2025-08-21
Payer: MEDICARE

## 2025-08-21 ENCOUNTER — TELEPHONE (OUTPATIENT)
Dept: INTERNAL MEDICINE | Age: 70
End: 2025-08-21

## 2025-08-21 DIAGNOSIS — Z00.00 INITIAL MEDICARE ANNUAL WELLNESS VISIT: Primary | ICD-10-CM

## 2025-08-21 PROCEDURE — G0438 PPPS, INITIAL VISIT: HCPCS | Performed by: NURSE PRACTITIONER

## 2025-08-21 PROCEDURE — 1159F MED LIST DOCD IN RCRD: CPT | Performed by: NURSE PRACTITIONER

## 2025-08-21 PROCEDURE — 1123F ACP DISCUSS/DSCN MKR DOCD: CPT | Performed by: NURSE PRACTITIONER

## 2025-08-21 PROCEDURE — 1160F RVW MEDS BY RX/DR IN RCRD: CPT | Performed by: NURSE PRACTITIONER

## 2025-08-21 ASSESSMENT — PATIENT HEALTH QUESTIONNAIRE - PHQ9
2. FEELING DOWN, DEPRESSED OR HOPELESS: NOT AT ALL
SUM OF ALL RESPONSES TO PHQ QUESTIONS 1-9: 0
1. LITTLE INTEREST OR PLEASURE IN DOING THINGS: NOT AT ALL